# Patient Record
Sex: FEMALE | Race: WHITE | NOT HISPANIC OR LATINO
[De-identification: names, ages, dates, MRNs, and addresses within clinical notes are randomized per-mention and may not be internally consistent; named-entity substitution may affect disease eponyms.]

---

## 2017-02-15 ENCOUNTER — APPOINTMENT (OUTPATIENT)
Dept: SURGERY | Facility: CLINIC | Age: 62
End: 2017-02-15

## 2017-02-27 ENCOUNTER — OUTPATIENT (OUTPATIENT)
Dept: OUTPATIENT SERVICES | Facility: HOSPITAL | Age: 62
LOS: 1 days | Discharge: HOME | End: 2017-02-27

## 2017-02-27 DIAGNOSIS — Z98.89 OTHER SPECIFIED POSTPROCEDURAL STATES: Chronic | ICD-10-CM

## 2017-02-27 DIAGNOSIS — Z90.13 ACQUIRED ABSENCE OF BILATERAL BREASTS AND NIPPLES: Chronic | ICD-10-CM

## 2017-02-27 DIAGNOSIS — C50.919 MALIGNANT NEOPLASM OF UNSPECIFIED SITE OF UNSPECIFIED FEMALE BREAST: ICD-10-CM

## 2017-02-28 ENCOUNTER — RESULT REVIEW (OUTPATIENT)
Age: 62
End: 2017-02-28

## 2017-03-01 ENCOUNTER — APPOINTMENT (OUTPATIENT)
Dept: HEMATOLOGY ONCOLOGY | Facility: CLINIC | Age: 62
End: 2017-03-01

## 2017-03-01 VITALS
HEART RATE: 83 BPM | DIASTOLIC BLOOD PRESSURE: 70 MMHG | OXYGEN SATURATION: 98 % | SYSTOLIC BLOOD PRESSURE: 102 MMHG | TEMPERATURE: 98.2 F | WEIGHT: 144.62 LBS | BODY MASS INDEX: 24.82 KG/M2 | RESPIRATION RATE: 16 BRPM

## 2017-03-01 LAB
HCT VFR BLD CALC: 39.4 % — SIGNIFICANT CHANGE UP (ref 34.5–45)
HGB BLD-MCNC: 13.1 G/DL — SIGNIFICANT CHANGE UP (ref 11.5–15.5)
MCHC RBC-ENTMCNC: 30 PG — SIGNIFICANT CHANGE UP (ref 27–34)
MCHC RBC-ENTMCNC: 33.4 G/DL — SIGNIFICANT CHANGE UP (ref 32–36)
MCV RBC AUTO: 90 FL — SIGNIFICANT CHANGE UP (ref 80–100)
PLATELET # BLD AUTO: 118 K/UL — LOW (ref 150–400)
RBC # BLD: 4.38 M/UL — SIGNIFICANT CHANGE UP (ref 3.8–5.2)
RBC # FLD: 14 % — SIGNIFICANT CHANGE UP (ref 10.3–14.5)
WBC # BLD: 3.5 K/UL — LOW (ref 3.8–10.5)
WBC # FLD AUTO: 3.5 K/UL — LOW (ref 3.8–10.5)

## 2017-03-03 LAB
ALBUMIN SERPL ELPH-MCNC: 4.1 G/DL
ALP BLD-CCNC: 66 U/L
ALT SERPL-CCNC: 22 U/L
ANION GAP SERPL CALC-SCNC: 15 MMOL/L
AST SERPL-CCNC: 21 U/L
BILIRUB SERPL-MCNC: 1 MG/DL
BUN SERPL-MCNC: 20 MG/DL
CALCIUM SERPL-MCNC: 9.4 MG/DL
CANCER AG27-29 SERPL-ACNC: 9.2 U/ML
CEA SERPL-MCNC: 4 NG/ML
CHLORIDE SERPL-SCNC: 102 MMOL/L
CO2 SERPL-SCNC: 25 MMOL/L
CREAT SERPL-MCNC: 0.77 MG/DL
GLUCOSE SERPL-MCNC: 90 MG/DL
POTASSIUM SERPL-SCNC: 4.5 MMOL/L
PROT SERPL-MCNC: 6.1 G/DL
SODIUM SERPL-SCNC: 142 MMOL/L

## 2017-03-06 ENCOUNTER — RESULT REVIEW (OUTPATIENT)
Age: 62
End: 2017-03-06

## 2017-03-16 ENCOUNTER — APPOINTMENT (OUTPATIENT)
Dept: RADIATION ONCOLOGY | Facility: CLINIC | Age: 62
End: 2017-03-16

## 2017-03-20 ENCOUNTER — FORM ENCOUNTER (OUTPATIENT)
Age: 62
End: 2017-03-20

## 2017-03-21 ENCOUNTER — APPOINTMENT (OUTPATIENT)
Dept: NUCLEAR MEDICINE | Facility: IMAGING CENTER | Age: 62
End: 2017-03-21

## 2017-03-21 ENCOUNTER — OUTPATIENT (OUTPATIENT)
Dept: OUTPATIENT SERVICES | Facility: HOSPITAL | Age: 62
LOS: 1 days | End: 2017-03-21
Payer: COMMERCIAL

## 2017-03-21 DIAGNOSIS — Z98.89 OTHER SPECIFIED POSTPROCEDURAL STATES: Chronic | ICD-10-CM

## 2017-03-21 DIAGNOSIS — C50.919 MALIGNANT NEOPLASM OF UNSPECIFIED SITE OF UNSPECIFIED FEMALE BREAST: ICD-10-CM

## 2017-03-21 DIAGNOSIS — Z90.13 ACQUIRED ABSENCE OF BILATERAL BREASTS AND NIPPLES: Chronic | ICD-10-CM

## 2017-03-21 PROCEDURE — A9552: CPT

## 2017-03-21 PROCEDURE — 78815 PET IMAGE W/CT SKULL-THIGH: CPT

## 2017-03-23 ENCOUNTER — FORM ENCOUNTER (OUTPATIENT)
Age: 62
End: 2017-03-23

## 2017-03-24 ENCOUNTER — OUTPATIENT (OUTPATIENT)
Dept: OUTPATIENT SERVICES | Facility: HOSPITAL | Age: 62
LOS: 1 days | End: 2017-03-24
Payer: COMMERCIAL

## 2017-03-24 ENCOUNTER — APPOINTMENT (OUTPATIENT)
Dept: RADIOLOGY | Facility: IMAGING CENTER | Age: 62
End: 2017-03-24

## 2017-03-24 DIAGNOSIS — Z98.89 OTHER SPECIFIED POSTPROCEDURAL STATES: Chronic | ICD-10-CM

## 2017-03-24 DIAGNOSIS — Z90.13 ACQUIRED ABSENCE OF BILATERAL BREASTS AND NIPPLES: Chronic | ICD-10-CM

## 2017-03-24 DIAGNOSIS — Z00.8 ENCOUNTER FOR OTHER GENERAL EXAMINATION: ICD-10-CM

## 2017-03-24 PROCEDURE — 77080 DXA BONE DENSITY AXIAL: CPT

## 2017-03-27 ENCOUNTER — APPOINTMENT (OUTPATIENT)
Dept: RADIATION ONCOLOGY | Facility: CLINIC | Age: 62
End: 2017-03-27

## 2017-03-27 VITALS
SYSTOLIC BLOOD PRESSURE: 103 MMHG | DIASTOLIC BLOOD PRESSURE: 66 MMHG | RESPIRATION RATE: 16 BRPM | OXYGEN SATURATION: 98 %

## 2017-03-28 RX ORDER — ANASTROZOLE TABLETS 1 MG/1
1 TABLET ORAL DAILY
Qty: 90 | Refills: 3 | Status: COMPLETED | COMMUNITY
Start: 2017-03-28 | End: 2018-03-23

## 2017-04-10 ENCOUNTER — OUTPATIENT (OUTPATIENT)
Dept: OUTPATIENT SERVICES | Facility: HOSPITAL | Age: 62
LOS: 1 days | Discharge: HOME | End: 2017-04-10

## 2017-04-10 DIAGNOSIS — Z90.13 ACQUIRED ABSENCE OF BILATERAL BREASTS AND NIPPLES: Chronic | ICD-10-CM

## 2017-04-10 DIAGNOSIS — Z98.89 OTHER SPECIFIED POSTPROCEDURAL STATES: Chronic | ICD-10-CM

## 2017-04-10 DIAGNOSIS — C50.919 MALIGNANT NEOPLASM OF UNSPECIFIED SITE OF UNSPECIFIED FEMALE BREAST: ICD-10-CM

## 2017-04-11 ENCOUNTER — APPOINTMENT (OUTPATIENT)
Dept: HEMATOLOGY ONCOLOGY | Facility: CLINIC | Age: 62
End: 2017-04-11

## 2017-04-11 VITALS
SYSTOLIC BLOOD PRESSURE: 108 MMHG | DIASTOLIC BLOOD PRESSURE: 69 MMHG | HEART RATE: 77 BPM | WEIGHT: 147.05 LBS | BODY MASS INDEX: 25.24 KG/M2 | OXYGEN SATURATION: 98 % | TEMPERATURE: 98 F | RESPIRATION RATE: 16 BRPM

## 2017-04-11 VITALS — HEIGHT: 64.57 IN | BODY MASS INDEX: 24.8 KG/M2

## 2017-04-16 ENCOUNTER — RESULT REVIEW (OUTPATIENT)
Age: 62
End: 2017-04-16

## 2017-04-17 ENCOUNTER — APPOINTMENT (OUTPATIENT)
Dept: HEMATOLOGY ONCOLOGY | Facility: CLINIC | Age: 62
End: 2017-04-17

## 2017-04-17 LAB
HCT VFR BLD CALC: 39.9 % — SIGNIFICANT CHANGE UP (ref 34.5–45)
HGB BLD-MCNC: 13.6 G/DL — SIGNIFICANT CHANGE UP (ref 11.5–15.5)
MCHC RBC-ENTMCNC: 31.7 PG — SIGNIFICANT CHANGE UP (ref 27–34)
MCHC RBC-ENTMCNC: 34.1 G/DL — SIGNIFICANT CHANGE UP (ref 32–36)
MCV RBC AUTO: 92.8 FL — SIGNIFICANT CHANGE UP (ref 80–100)
PLATELET # BLD AUTO: 153 K/UL — SIGNIFICANT CHANGE UP (ref 150–400)
RBC # BLD: 4.3 M/UL — SIGNIFICANT CHANGE UP (ref 3.8–5.2)
RBC # FLD: 13.6 % — SIGNIFICANT CHANGE UP (ref 10.3–14.5)
WBC # BLD: 4.6 K/UL — SIGNIFICANT CHANGE UP (ref 3.8–10.5)
WBC # FLD AUTO: 4.6 K/UL — SIGNIFICANT CHANGE UP (ref 3.8–10.5)

## 2017-04-30 ENCOUNTER — RESULT REVIEW (OUTPATIENT)
Age: 62
End: 2017-04-30

## 2017-05-01 ENCOUNTER — APPOINTMENT (OUTPATIENT)
Dept: HEMATOLOGY ONCOLOGY | Facility: CLINIC | Age: 62
End: 2017-05-01

## 2017-05-01 VITALS — TEMPERATURE: 98.2 F

## 2017-05-12 ENCOUNTER — OUTPATIENT (OUTPATIENT)
Dept: OUTPATIENT SERVICES | Facility: HOSPITAL | Age: 62
LOS: 1 days | Discharge: HOME | End: 2017-05-12

## 2017-05-12 DIAGNOSIS — R00.2 PALPITATIONS: ICD-10-CM

## 2017-05-12 DIAGNOSIS — Z90.13 ACQUIRED ABSENCE OF BILATERAL BREASTS AND NIPPLES: Chronic | ICD-10-CM

## 2017-05-12 DIAGNOSIS — Z98.89 OTHER SPECIFIED POSTPROCEDURAL STATES: Chronic | ICD-10-CM

## 2017-05-12 DIAGNOSIS — C50.919 MALIGNANT NEOPLASM OF UNSPECIFIED SITE OF UNSPECIFIED FEMALE BREAST: ICD-10-CM

## 2017-05-15 ENCOUNTER — RESULT REVIEW (OUTPATIENT)
Age: 62
End: 2017-05-15

## 2017-05-15 ENCOUNTER — APPOINTMENT (OUTPATIENT)
Dept: HEMATOLOGY ONCOLOGY | Facility: CLINIC | Age: 62
End: 2017-05-15

## 2017-05-15 LAB
BASOPHILS # BLD AUTO: 0 K/UL — SIGNIFICANT CHANGE UP (ref 0–0.2)
BASOPHILS NFR BLD AUTO: 1.5 % — SIGNIFICANT CHANGE UP (ref 0–2)
EOSINOPHIL # BLD AUTO: 0.1 K/UL — SIGNIFICANT CHANGE UP (ref 0–0.5)
EOSINOPHIL NFR BLD AUTO: 2.4 % — SIGNIFICANT CHANGE UP (ref 0–6)
HCT VFR BLD CALC: 32.8 % — LOW (ref 34.5–45)
HGB BLD-MCNC: 12.2 G/DL — SIGNIFICANT CHANGE UP (ref 11.5–15.5)
LYMPHOCYTES # BLD AUTO: 1 K/UL — SIGNIFICANT CHANGE UP (ref 1–3.3)
LYMPHOCYTES # BLD AUTO: 35 % — SIGNIFICANT CHANGE UP (ref 13–44)
MCHC RBC-ENTMCNC: 35.1 PG — HIGH (ref 27–34)
MCHC RBC-ENTMCNC: 37.3 G/DL — HIGH (ref 32–36)
MCV RBC AUTO: 94 FL — SIGNIFICANT CHANGE UP (ref 80–100)
MONOCYTES # BLD AUTO: 0.3 K/UL — SIGNIFICANT CHANGE UP (ref 0–0.9)
MONOCYTES NFR BLD AUTO: 11.4 % — SIGNIFICANT CHANGE UP (ref 2–14)
NEUTROPHILS # BLD AUTO: 1.4 K/UL — LOW (ref 1.8–7.4)
NEUTROPHILS NFR BLD AUTO: 49.7 % — SIGNIFICANT CHANGE UP (ref 43–77)
PLATELET # BLD AUTO: 156 K/UL — SIGNIFICANT CHANGE UP (ref 150–400)
RBC # BLD: 3.49 M/UL — LOW (ref 3.8–5.2)
RBC # FLD: 14.4 % — SIGNIFICANT CHANGE UP (ref 10.3–14.5)
WBC # BLD: 2.8 K/UL — LOW (ref 3.8–10.5)
WBC # FLD AUTO: 2.8 K/UL — LOW (ref 3.8–10.5)

## 2017-05-16 ENCOUNTER — APPOINTMENT (OUTPATIENT)
Dept: HEMATOLOGY ONCOLOGY | Facility: CLINIC | Age: 62
End: 2017-05-16

## 2017-05-16 VITALS
DIASTOLIC BLOOD PRESSURE: 64 MMHG | OXYGEN SATURATION: 98 % | TEMPERATURE: 98.2 F | BODY MASS INDEX: 25.21 KG/M2 | HEART RATE: 64 BPM | WEIGHT: 149.47 LBS | SYSTOLIC BLOOD PRESSURE: 111 MMHG | RESPIRATION RATE: 16 BRPM

## 2017-05-16 LAB
ALBUMIN SERPL ELPH-MCNC: 3.9 G/DL
ALP BLD-CCNC: 69 U/L
ALT SERPL-CCNC: 12 U/L
ANION GAP SERPL CALC-SCNC: 12 MMOL/L
AST SERPL-CCNC: 14 U/L
BILIRUB SERPL-MCNC: 0.9 MG/DL
BUN SERPL-MCNC: 17 MG/DL
CALCIUM SERPL-MCNC: 8.9 MG/DL
CHLORIDE SERPL-SCNC: 103 MMOL/L
CO2 SERPL-SCNC: 24 MMOL/L
CREAT SERPL-MCNC: 0.91 MG/DL
GLUCOSE SERPL-MCNC: 103 MG/DL
POTASSIUM SERPL-SCNC: 4.2 MMOL/L
PROT SERPL-MCNC: 6.1 G/DL
SODIUM SERPL-SCNC: 139 MMOL/L

## 2017-05-30 ENCOUNTER — RESULT REVIEW (OUTPATIENT)
Age: 62
End: 2017-05-30

## 2017-05-30 ENCOUNTER — APPOINTMENT (OUTPATIENT)
Dept: HEMATOLOGY ONCOLOGY | Facility: CLINIC | Age: 62
End: 2017-05-30

## 2017-05-30 LAB
BASOPHILS # BLD AUTO: 0 K/UL — SIGNIFICANT CHANGE UP (ref 0–0.2)
BASOPHILS NFR BLD AUTO: 1.5 % — SIGNIFICANT CHANGE UP (ref 0–2)
EOSINOPHIL # BLD AUTO: 0.1 K/UL — SIGNIFICANT CHANGE UP (ref 0–0.5)
EOSINOPHIL NFR BLD AUTO: 5.3 % — SIGNIFICANT CHANGE UP (ref 0–6)
HCT VFR BLD CALC: 35.5 % — SIGNIFICANT CHANGE UP (ref 34.5–45)
HGB BLD-MCNC: 12.6 G/DL — SIGNIFICANT CHANGE UP (ref 11.5–15.5)
LYMPHOCYTES # BLD AUTO: 0.8 K/UL — LOW (ref 1–3.3)
LYMPHOCYTES # BLD AUTO: 29.5 % — SIGNIFICANT CHANGE UP (ref 13–44)
MCHC RBC-ENTMCNC: 34.3 PG — HIGH (ref 27–34)
MCHC RBC-ENTMCNC: 35.6 G/DL — SIGNIFICANT CHANGE UP (ref 32–36)
MCV RBC AUTO: 96.3 FL — SIGNIFICANT CHANGE UP (ref 80–100)
MONOCYTES # BLD AUTO: 0.1 K/UL — SIGNIFICANT CHANGE UP (ref 0–0.9)
MONOCYTES NFR BLD AUTO: 4.8 % — SIGNIFICANT CHANGE UP (ref 2–14)
NEUTROPHILS # BLD AUTO: 1.7 K/UL — LOW (ref 1.8–7.4)
NEUTROPHILS NFR BLD AUTO: 58.9 % — SIGNIFICANT CHANGE UP (ref 43–77)
PLATELET # BLD AUTO: 175 K/UL — SIGNIFICANT CHANGE UP (ref 150–400)
RBC # BLD: 3.68 M/UL — LOW (ref 3.8–5.2)
RBC # FLD: 13.9 % — SIGNIFICANT CHANGE UP (ref 10.3–14.5)
WBC # BLD: 2.8 K/UL — LOW (ref 3.8–10.5)
WBC # FLD AUTO: 2.8 K/UL — LOW (ref 3.8–10.5)

## 2017-06-08 ENCOUNTER — OUTPATIENT (OUTPATIENT)
Dept: OUTPATIENT SERVICES | Facility: HOSPITAL | Age: 62
LOS: 1 days | Discharge: HOME | End: 2017-06-08

## 2017-06-08 DIAGNOSIS — Z98.89 OTHER SPECIFIED POSTPROCEDURAL STATES: Chronic | ICD-10-CM

## 2017-06-08 DIAGNOSIS — Z90.13 ACQUIRED ABSENCE OF BILATERAL BREASTS AND NIPPLES: Chronic | ICD-10-CM

## 2017-06-08 DIAGNOSIS — C50.919 MALIGNANT NEOPLASM OF UNSPECIFIED SITE OF UNSPECIFIED FEMALE BREAST: ICD-10-CM

## 2017-06-12 ENCOUNTER — APPOINTMENT (OUTPATIENT)
Dept: HEMATOLOGY ONCOLOGY | Facility: CLINIC | Age: 62
End: 2017-06-12

## 2017-06-13 ENCOUNTER — RESULT REVIEW (OUTPATIENT)
Age: 62
End: 2017-06-13

## 2017-06-13 ENCOUNTER — APPOINTMENT (OUTPATIENT)
Dept: HEMATOLOGY ONCOLOGY | Facility: CLINIC | Age: 62
End: 2017-06-13

## 2017-06-14 ENCOUNTER — APPOINTMENT (OUTPATIENT)
Dept: HEMATOLOGY ONCOLOGY | Facility: CLINIC | Age: 62
End: 2017-06-14

## 2017-06-14 VITALS
HEART RATE: 67 BPM | TEMPERATURE: 98.6 F | BODY MASS INDEX: 25.13 KG/M2 | WEIGHT: 148.99 LBS | OXYGEN SATURATION: 98 % | HEIGHT: 64.57 IN | RESPIRATION RATE: 16 BRPM | DIASTOLIC BLOOD PRESSURE: 64 MMHG | SYSTOLIC BLOOD PRESSURE: 102 MMHG

## 2017-06-14 LAB
ALBUMIN SERPL ELPH-MCNC: 4.3 G/DL
ALP BLD-CCNC: 72 U/L
ALT SERPL-CCNC: 44 U/L
ANION GAP SERPL CALC-SCNC: 15 MMOL/L
AST SERPL-CCNC: 31 U/L
BILIRUB SERPL-MCNC: 1 MG/DL
BUN SERPL-MCNC: 17 MG/DL
CALCIUM SERPL-MCNC: 9.2 MG/DL
CANCER AG27-29 SERPL-ACNC: 12.6 U/ML
CEA SERPL-MCNC: 4.8 NG/ML
CHLORIDE SERPL-SCNC: 104 MMOL/L
CO2 SERPL-SCNC: 23 MMOL/L
CREAT SERPL-MCNC: 0.87 MG/DL
GLUCOSE SERPL-MCNC: 98 MG/DL
POTASSIUM SERPL-SCNC: 4.5 MMOL/L
PROT SERPL-MCNC: 6.4 G/DL
SODIUM SERPL-SCNC: 142 MMOL/L

## 2017-06-29 ENCOUNTER — MESSAGE (OUTPATIENT)
Age: 62
End: 2017-06-29

## 2017-07-11 ENCOUNTER — OUTPATIENT (OUTPATIENT)
Dept: OUTPATIENT SERVICES | Facility: HOSPITAL | Age: 62
LOS: 1 days | Discharge: HOME | End: 2017-07-11

## 2017-07-11 DIAGNOSIS — C50.919 MALIGNANT NEOPLASM OF UNSPECIFIED SITE OF UNSPECIFIED FEMALE BREAST: ICD-10-CM

## 2017-07-11 DIAGNOSIS — Z90.13 ACQUIRED ABSENCE OF BILATERAL BREASTS AND NIPPLES: Chronic | ICD-10-CM

## 2017-07-11 DIAGNOSIS — Z98.89 OTHER SPECIFIED POSTPROCEDURAL STATES: Chronic | ICD-10-CM

## 2017-07-12 ENCOUNTER — RESULT REVIEW (OUTPATIENT)
Age: 62
End: 2017-07-12

## 2017-07-12 ENCOUNTER — APPOINTMENT (OUTPATIENT)
Dept: HEMATOLOGY ONCOLOGY | Facility: CLINIC | Age: 62
End: 2017-07-12

## 2017-07-12 LAB
BASOPHILS # BLD AUTO: 0 K/UL — SIGNIFICANT CHANGE UP (ref 0–0.2)
BASOPHILS NFR BLD AUTO: 1.4 % — SIGNIFICANT CHANGE UP (ref 0–2)
EOSINOPHIL # BLD AUTO: 0.1 K/UL — SIGNIFICANT CHANGE UP (ref 0–0.5)
EOSINOPHIL NFR BLD AUTO: 4 % — SIGNIFICANT CHANGE UP (ref 0–6)
HCT VFR BLD CALC: 38 % — SIGNIFICANT CHANGE UP (ref 34.5–45)
HGB BLD-MCNC: 13.6 G/DL — SIGNIFICANT CHANGE UP (ref 11.5–15.5)
LYMPHOCYTES # BLD AUTO: 0.9 K/UL — LOW (ref 1–3.3)
LYMPHOCYTES # BLD AUTO: 35.4 % — SIGNIFICANT CHANGE UP (ref 13–44)
MCHC RBC-ENTMCNC: 35.8 G/DL — SIGNIFICANT CHANGE UP (ref 32–36)
MCHC RBC-ENTMCNC: 36.2 PG — HIGH (ref 27–34)
MCV RBC AUTO: 101 FL — HIGH (ref 80–100)
MONOCYTES # BLD AUTO: 0.3 K/UL — SIGNIFICANT CHANGE UP (ref 0–0.9)
MONOCYTES NFR BLD AUTO: 12.8 % — SIGNIFICANT CHANGE UP (ref 2–14)
NEUTROPHILS # BLD AUTO: 1.2 K/UL — LOW (ref 1.8–7.4)
NEUTROPHILS NFR BLD AUTO: 46.4 % — SIGNIFICANT CHANGE UP (ref 43–77)
PLATELET # BLD AUTO: 124 K/UL — LOW (ref 150–400)
RBC # BLD: 3.76 M/UL — LOW (ref 3.8–5.2)
RBC # FLD: 13.8 % — SIGNIFICANT CHANGE UP (ref 10.3–14.5)
WBC # BLD: 2.5 K/UL — LOW (ref 3.8–10.5)
WBC # FLD AUTO: 2.5 K/UL — LOW (ref 3.8–10.5)

## 2017-07-14 ENCOUNTER — FORM ENCOUNTER (OUTPATIENT)
Age: 62
End: 2017-07-14

## 2017-07-15 ENCOUNTER — OUTPATIENT (OUTPATIENT)
Dept: OUTPATIENT SERVICES | Facility: HOSPITAL | Age: 62
LOS: 1 days | End: 2017-07-15
Payer: COMMERCIAL

## 2017-07-15 ENCOUNTER — APPOINTMENT (OUTPATIENT)
Dept: CT IMAGING | Facility: IMAGING CENTER | Age: 62
End: 2017-07-15

## 2017-07-15 DIAGNOSIS — Z90.13 ACQUIRED ABSENCE OF BILATERAL BREASTS AND NIPPLES: Chronic | ICD-10-CM

## 2017-07-15 DIAGNOSIS — Z98.89 OTHER SPECIFIED POSTPROCEDURAL STATES: Chronic | ICD-10-CM

## 2017-07-15 DIAGNOSIS — C50.919 MALIGNANT NEOPLASM OF UNSPECIFIED SITE OF UNSPECIFIED FEMALE BREAST: ICD-10-CM

## 2017-07-15 PROCEDURE — 74177 CT ABD & PELVIS W/CONTRAST: CPT

## 2017-07-15 PROCEDURE — 71260 CT THORAX DX C+: CPT

## 2017-07-17 LAB — CEA SERPL-MCNC: 4.8 NG/ML

## 2017-08-02 ENCOUNTER — APPOINTMENT (OUTPATIENT)
Dept: HEMATOLOGY ONCOLOGY | Facility: CLINIC | Age: 62
End: 2017-08-02
Payer: COMMERCIAL

## 2017-08-02 ENCOUNTER — RESULT REVIEW (OUTPATIENT)
Age: 62
End: 2017-08-02

## 2017-08-02 LAB
BASOPHILS # BLD AUTO: 0 K/UL — SIGNIFICANT CHANGE UP (ref 0–0.2)
BASOPHILS NFR BLD AUTO: 1.8 % — SIGNIFICANT CHANGE UP (ref 0–2)
EOSINOPHIL # BLD AUTO: 0.2 K/UL — SIGNIFICANT CHANGE UP (ref 0–0.5)
EOSINOPHIL NFR BLD AUTO: 7.8 % — HIGH (ref 0–6)
HCT VFR BLD CALC: 38.7 % — SIGNIFICANT CHANGE UP (ref 34.5–45)
HGB BLD-MCNC: 14 G/DL — SIGNIFICANT CHANGE UP (ref 11.5–15.5)
LYMPHOCYTES # BLD AUTO: 0.7 K/UL — LOW (ref 1–3.3)
LYMPHOCYTES # BLD AUTO: 26.8 % — SIGNIFICANT CHANGE UP (ref 13–44)
MCHC RBC-ENTMCNC: 36.2 G/DL — HIGH (ref 32–36)
MCHC RBC-ENTMCNC: 36.8 PG — HIGH (ref 27–34)
MCV RBC AUTO: 102 FL — HIGH (ref 80–100)
MONOCYTES # BLD AUTO: 0.1 K/UL — SIGNIFICANT CHANGE UP (ref 0–0.9)
MONOCYTES NFR BLD AUTO: 5.2 % — SIGNIFICANT CHANGE UP (ref 2–14)
NEUTROPHILS # BLD AUTO: 1.5 K/UL — LOW (ref 1.8–7.4)
NEUTROPHILS NFR BLD AUTO: 58.4 % — SIGNIFICANT CHANGE UP (ref 43–77)
PLATELET # BLD AUTO: 107 K/UL — LOW (ref 150–400)
RBC # BLD: 3.8 M/UL — SIGNIFICANT CHANGE UP (ref 3.8–5.2)
RBC # FLD: 13.1 % — SIGNIFICANT CHANGE UP (ref 10.3–14.5)
WBC # BLD: 2.5 K/UL — LOW (ref 3.8–10.5)
WBC # FLD AUTO: 2.5 K/UL — LOW (ref 3.8–10.5)

## 2017-08-02 PROCEDURE — 99499A: CUSTOM | Mod: NC

## 2017-08-11 ENCOUNTER — MESSAGE (OUTPATIENT)
Age: 62
End: 2017-08-11

## 2017-08-11 LAB
CEA SERPL-MCNC: 4.7 NG/ML
INR PPP: 0.98 RATIO
PT BLD: 11.1 SEC

## 2017-08-28 ENCOUNTER — OUTPATIENT (OUTPATIENT)
Dept: OUTPATIENT SERVICES | Facility: HOSPITAL | Age: 62
LOS: 1 days | Discharge: HOME | End: 2017-08-28

## 2017-08-28 DIAGNOSIS — Z90.13 ACQUIRED ABSENCE OF BILATERAL BREASTS AND NIPPLES: Chronic | ICD-10-CM

## 2017-08-28 DIAGNOSIS — C50.919 MALIGNANT NEOPLASM OF UNSPECIFIED SITE OF UNSPECIFIED FEMALE BREAST: ICD-10-CM

## 2017-08-28 DIAGNOSIS — Z98.89 OTHER SPECIFIED POSTPROCEDURAL STATES: Chronic | ICD-10-CM

## 2017-09-05 ENCOUNTER — APPOINTMENT (OUTPATIENT)
Dept: HEMATOLOGY ONCOLOGY | Facility: CLINIC | Age: 62
End: 2017-09-05

## 2017-09-06 ENCOUNTER — APPOINTMENT (OUTPATIENT)
Dept: HEMATOLOGY ONCOLOGY | Facility: CLINIC | Age: 62
End: 2017-09-06
Payer: COMMERCIAL

## 2017-09-06 VITALS
OXYGEN SATURATION: 97 % | HEART RATE: 116 BPM | SYSTOLIC BLOOD PRESSURE: 105 MMHG | TEMPERATURE: 98.3 F | DIASTOLIC BLOOD PRESSURE: 64 MMHG | RESPIRATION RATE: 17 BRPM | WEIGHT: 151.46 LBS | BODY MASS INDEX: 25.54 KG/M2

## 2017-09-06 LAB
CANCER AG27-29 SERPL-ACNC: 12.4 U/ML
CEA SERPL-MCNC: 4.4 NG/ML

## 2017-09-06 PROCEDURE — 99214 OFFICE O/P EST MOD 30 MIN: CPT

## 2017-10-20 ENCOUNTER — FORM ENCOUNTER (OUTPATIENT)
Age: 62
End: 2017-10-20

## 2017-10-21 ENCOUNTER — OUTPATIENT (OUTPATIENT)
Dept: OUTPATIENT SERVICES | Facility: HOSPITAL | Age: 62
LOS: 1 days | End: 2017-10-21
Payer: COMMERCIAL

## 2017-10-21 ENCOUNTER — APPOINTMENT (OUTPATIENT)
Dept: CT IMAGING | Facility: IMAGING CENTER | Age: 62
End: 2017-10-21
Payer: COMMERCIAL

## 2017-10-21 DIAGNOSIS — C50.919 MALIGNANT NEOPLASM OF UNSPECIFIED SITE OF UNSPECIFIED FEMALE BREAST: ICD-10-CM

## 2017-10-21 DIAGNOSIS — Z98.89 OTHER SPECIFIED POSTPROCEDURAL STATES: Chronic | ICD-10-CM

## 2017-10-21 DIAGNOSIS — R97.0 ELEVATED CARCINOEMBRYONIC ANTIGEN [CEA]: ICD-10-CM

## 2017-10-21 DIAGNOSIS — Z90.13 ACQUIRED ABSENCE OF BILATERAL BREASTS AND NIPPLES: Chronic | ICD-10-CM

## 2017-10-21 PROCEDURE — 74177 CT ABD & PELVIS W/CONTRAST: CPT | Mod: 26

## 2017-10-21 PROCEDURE — 71260 CT THORAX DX C+: CPT | Mod: 26

## 2017-10-21 PROCEDURE — 82565 ASSAY OF CREATININE: CPT

## 2017-10-21 PROCEDURE — 74177 CT ABD & PELVIS W/CONTRAST: CPT

## 2017-10-21 PROCEDURE — 71260 CT THORAX DX C+: CPT

## 2017-11-24 ENCOUNTER — OUTPATIENT (OUTPATIENT)
Dept: OUTPATIENT SERVICES | Facility: HOSPITAL | Age: 62
LOS: 1 days | Discharge: HOME | End: 2017-11-24

## 2017-11-24 DIAGNOSIS — Z90.13 ACQUIRED ABSENCE OF BILATERAL BREASTS AND NIPPLES: Chronic | ICD-10-CM

## 2017-11-24 DIAGNOSIS — Z98.89 OTHER SPECIFIED POSTPROCEDURAL STATES: Chronic | ICD-10-CM

## 2017-11-24 DIAGNOSIS — C50.919 MALIGNANT NEOPLASM OF UNSPECIFIED SITE OF UNSPECIFIED FEMALE BREAST: ICD-10-CM

## 2017-11-29 ENCOUNTER — APPOINTMENT (OUTPATIENT)
Dept: HEMATOLOGY ONCOLOGY | Facility: CLINIC | Age: 62
End: 2017-11-29

## 2017-11-30 ENCOUNTER — APPOINTMENT (OUTPATIENT)
Dept: HEMATOLOGY ONCOLOGY | Facility: CLINIC | Age: 62
End: 2017-11-30
Payer: COMMERCIAL

## 2017-11-30 ENCOUNTER — APPOINTMENT (OUTPATIENT)
Dept: HEMATOLOGY ONCOLOGY | Facility: CLINIC | Age: 62
End: 2017-11-30

## 2017-11-30 VITALS
RESPIRATION RATE: 16 BRPM | BODY MASS INDEX: 25.47 KG/M2 | HEART RATE: 63 BPM | SYSTOLIC BLOOD PRESSURE: 112 MMHG | TEMPERATURE: 98.4 F | DIASTOLIC BLOOD PRESSURE: 67 MMHG | OXYGEN SATURATION: 98 % | WEIGHT: 151.02 LBS

## 2017-11-30 LAB
CANCER AG27-29 SERPL-ACNC: 15.3 U/ML
CEA SERPL-MCNC: 4 NG/ML

## 2017-11-30 PROCEDURE — 99214 OFFICE O/P EST MOD 30 MIN: CPT

## 2017-11-30 RX ORDER — ANASTROZOLE TABLETS 1 MG/1
1 TABLET ORAL DAILY
Qty: 90 | Refills: 3 | Status: COMPLETED | COMMUNITY
Start: 2017-11-30 | End: 2018-11-25

## 2018-01-31 ENCOUNTER — CHART COPY (OUTPATIENT)
Age: 63
End: 2018-01-31

## 2018-02-02 ENCOUNTER — APPOINTMENT (OUTPATIENT)
Dept: SURGERY | Facility: CLINIC | Age: 63
End: 2018-02-02
Payer: COMMERCIAL

## 2018-02-02 PROCEDURE — 99213K: CUSTOM

## 2018-02-14 ENCOUNTER — OUTPATIENT (OUTPATIENT)
Dept: OUTPATIENT SERVICES | Facility: HOSPITAL | Age: 63
LOS: 1 days | Discharge: HOME | End: 2018-02-14

## 2018-02-14 DIAGNOSIS — Z98.89 OTHER SPECIFIED POSTPROCEDURAL STATES: Chronic | ICD-10-CM

## 2018-02-14 DIAGNOSIS — C50.919 MALIGNANT NEOPLASM OF UNSPECIFIED SITE OF UNSPECIFIED FEMALE BREAST: ICD-10-CM

## 2018-02-14 DIAGNOSIS — Z90.13 ACQUIRED ABSENCE OF BILATERAL BREASTS AND NIPPLES: Chronic | ICD-10-CM

## 2018-02-20 ENCOUNTER — APPOINTMENT (OUTPATIENT)
Dept: HEMATOLOGY ONCOLOGY | Facility: CLINIC | Age: 63
End: 2018-02-20

## 2018-02-21 ENCOUNTER — APPOINTMENT (OUTPATIENT)
Dept: HEMATOLOGY ONCOLOGY | Facility: CLINIC | Age: 63
End: 2018-02-21
Payer: COMMERCIAL

## 2018-02-21 VITALS
DIASTOLIC BLOOD PRESSURE: 73 MMHG | RESPIRATION RATE: 16 BRPM | HEART RATE: 73 BPM | SYSTOLIC BLOOD PRESSURE: 111 MMHG | OXYGEN SATURATION: 98 % | TEMPERATURE: 98.3 F | WEIGHT: 147.25 LBS | BODY MASS INDEX: 24.83 KG/M2

## 2018-02-21 PROCEDURE — 99215 OFFICE O/P EST HI 40 MIN: CPT

## 2018-05-09 LAB
CANCER AG27-29 SERPL-ACNC: 12.6 U/ML
CEA SERPL-MCNC: 4 NG/ML
CHOLEST SERPL-MCNC: 162 MG/DL
CHOLEST/HDLC SERPL: 2.4 RATIO
HDLC SERPL-MCNC: 68 MG/DL
LDLC SERPL CALC-MCNC: 85 MG/DL
TRIGL SERPL-MCNC: 43 MG/DL

## 2018-05-10 ENCOUNTER — OUTPATIENT (OUTPATIENT)
Dept: OUTPATIENT SERVICES | Facility: HOSPITAL | Age: 63
LOS: 1 days | Discharge: HOME | End: 2018-05-10

## 2018-05-10 DIAGNOSIS — Z98.89 OTHER SPECIFIED POSTPROCEDURAL STATES: Chronic | ICD-10-CM

## 2018-05-10 DIAGNOSIS — C50.919 MALIGNANT NEOPLASM OF UNSPECIFIED SITE OF UNSPECIFIED FEMALE BREAST: ICD-10-CM

## 2018-05-10 DIAGNOSIS — Z90.13 ACQUIRED ABSENCE OF BILATERAL BREASTS AND NIPPLES: Chronic | ICD-10-CM

## 2018-05-16 ENCOUNTER — APPOINTMENT (OUTPATIENT)
Dept: HEMATOLOGY ONCOLOGY | Facility: CLINIC | Age: 63
End: 2018-05-16

## 2018-05-16 ENCOUNTER — APPOINTMENT (OUTPATIENT)
Dept: HEMATOLOGY ONCOLOGY | Facility: CLINIC | Age: 63
End: 2018-05-16
Payer: COMMERCIAL

## 2018-05-16 ENCOUNTER — RESULT REVIEW (OUTPATIENT)
Age: 63
End: 2018-05-16

## 2018-05-16 LAB
BASOPHILS # BLD AUTO: 0 K/UL — SIGNIFICANT CHANGE UP (ref 0–0.2)
BASOPHILS NFR BLD AUTO: 1.4 % — SIGNIFICANT CHANGE UP (ref 0–2)
EOSINOPHIL # BLD AUTO: 0.1 K/UL — SIGNIFICANT CHANGE UP (ref 0–0.5)
EOSINOPHIL NFR BLD AUTO: 4.1 % — SIGNIFICANT CHANGE UP (ref 0–6)
HCT VFR BLD CALC: 40.1 % — SIGNIFICANT CHANGE UP (ref 34.5–45)
HGB BLD-MCNC: 14.3 G/DL — SIGNIFICANT CHANGE UP (ref 11.5–15.5)
LYMPHOCYTES # BLD AUTO: 0.9 K/UL — LOW (ref 1–3.3)
LYMPHOCYTES # BLD AUTO: 32.5 % — SIGNIFICANT CHANGE UP (ref 13–44)
MCHC RBC-ENTMCNC: 35.6 G/DL — SIGNIFICANT CHANGE UP (ref 32–36)
MCHC RBC-ENTMCNC: 36.1 PG — HIGH (ref 27–34)
MCV RBC AUTO: 101 FL — HIGH (ref 80–100)
MONOCYTES # BLD AUTO: 0.4 K/UL — SIGNIFICANT CHANGE UP (ref 0–0.9)
MONOCYTES NFR BLD AUTO: 12.7 % — SIGNIFICANT CHANGE UP (ref 2–14)
NEUTROPHILS # BLD AUTO: 1.4 K/UL — LOW (ref 1.8–7.4)
NEUTROPHILS NFR BLD AUTO: 49.3 % — SIGNIFICANT CHANGE UP (ref 43–77)
PLATELET # BLD AUTO: 127 K/UL — LOW (ref 150–400)
RBC # BLD: 3.96 M/UL — SIGNIFICANT CHANGE UP (ref 3.8–5.2)
RBC # FLD: 12.1 % — SIGNIFICANT CHANGE UP (ref 10.3–14.5)
WBC # BLD: 2.8 K/UL — LOW (ref 3.8–10.5)
WBC # FLD AUTO: 2.8 K/UL — LOW (ref 3.8–10.5)

## 2018-05-17 ENCOUNTER — APPOINTMENT (OUTPATIENT)
Dept: HEMATOLOGY ONCOLOGY | Facility: CLINIC | Age: 63
End: 2018-05-17
Payer: COMMERCIAL

## 2018-05-17 VITALS
HEART RATE: 61 BPM | WEIGHT: 145 LBS | TEMPERATURE: 98.3 F | DIASTOLIC BLOOD PRESSURE: 64 MMHG | OXYGEN SATURATION: 98 % | BODY MASS INDEX: 24.45 KG/M2 | RESPIRATION RATE: 16 BRPM | SYSTOLIC BLOOD PRESSURE: 102 MMHG

## 2018-05-17 DIAGNOSIS — D64.9 ANEMIA, UNSPECIFIED: ICD-10-CM

## 2018-05-17 PROCEDURE — 99214 OFFICE O/P EST MOD 30 MIN: CPT

## 2018-08-03 ENCOUNTER — OUTPATIENT (OUTPATIENT)
Dept: OUTPATIENT SERVICES | Facility: HOSPITAL | Age: 63
LOS: 1 days | Discharge: HOME | End: 2018-08-03

## 2018-08-03 DIAGNOSIS — Z98.89 OTHER SPECIFIED POSTPROCEDURAL STATES: Chronic | ICD-10-CM

## 2018-08-03 DIAGNOSIS — C50.919 MALIGNANT NEOPLASM OF UNSPECIFIED SITE OF UNSPECIFIED FEMALE BREAST: ICD-10-CM

## 2018-08-03 DIAGNOSIS — Z90.13 ACQUIRED ABSENCE OF BILATERAL BREASTS AND NIPPLES: Chronic | ICD-10-CM

## 2018-08-14 ENCOUNTER — APPOINTMENT (OUTPATIENT)
Dept: HEMATOLOGY ONCOLOGY | Facility: CLINIC | Age: 63
End: 2018-08-14

## 2018-08-15 ENCOUNTER — APPOINTMENT (OUTPATIENT)
Dept: HEMATOLOGY ONCOLOGY | Facility: CLINIC | Age: 63
End: 2018-08-15
Payer: COMMERCIAL

## 2018-08-15 ENCOUNTER — APPOINTMENT (OUTPATIENT)
Dept: HEMATOLOGY ONCOLOGY | Facility: CLINIC | Age: 63
End: 2018-08-15

## 2018-08-15 ENCOUNTER — RESULT REVIEW (OUTPATIENT)
Age: 63
End: 2018-08-15

## 2018-08-15 VITALS
WEIGHT: 147.71 LBS | OXYGEN SATURATION: 99 % | BODY MASS INDEX: 24.91 KG/M2 | DIASTOLIC BLOOD PRESSURE: 66 MMHG | RESPIRATION RATE: 16 BRPM | HEART RATE: 58 BPM | SYSTOLIC BLOOD PRESSURE: 113 MMHG | TEMPERATURE: 97.9 F

## 2018-08-15 DIAGNOSIS — Z00.6 ENCOUNTER FOR EXAMINATION FOR NORMAL COMPARISON AND CONTROL IN CLINICAL RESEARCH PROGRAM: ICD-10-CM

## 2018-08-15 LAB
HCT VFR BLD CALC: 39.5 % — SIGNIFICANT CHANGE UP (ref 34.5–45)
HGB BLD-MCNC: 14 G/DL — SIGNIFICANT CHANGE UP (ref 11.5–15.5)
MCHC RBC-ENTMCNC: 35.4 G/DL — SIGNIFICANT CHANGE UP (ref 32–36)
MCHC RBC-ENTMCNC: 35.9 PG — HIGH (ref 27–34)
MCV RBC AUTO: 101 FL — HIGH (ref 80–100)
PLATELET # BLD AUTO: 157 K/UL — SIGNIFICANT CHANGE UP (ref 150–400)
RBC # BLD: 3.91 M/UL — SIGNIFICANT CHANGE UP (ref 3.8–5.2)
RBC # FLD: 11.7 % — SIGNIFICANT CHANGE UP (ref 10.3–14.5)
WBC # BLD: 3.9 K/UL — SIGNIFICANT CHANGE UP (ref 3.8–10.5)
WBC # FLD AUTO: 3.9 K/UL — SIGNIFICANT CHANGE UP (ref 3.8–10.5)

## 2018-08-15 PROCEDURE — 99214 OFFICE O/P EST MOD 30 MIN: CPT

## 2018-08-15 RX ORDER — CALCIUM CITRATE/VITAMIN D3 315MG-6.25
TABLET ORAL
Refills: 0 | Status: ACTIVE | COMMUNITY

## 2018-08-16 ENCOUNTER — MESSAGE (OUTPATIENT)
Age: 63
End: 2018-08-16

## 2018-08-16 LAB
CANCER AG27-29 SERPL-ACNC: 13.8 U/ML
CEA SERPL-MCNC: 4.4 NG/ML

## 2018-08-27 ENCOUNTER — RESULT REVIEW (OUTPATIENT)
Age: 63
End: 2018-08-27

## 2018-10-29 ENCOUNTER — OUTPATIENT (OUTPATIENT)
Dept: OUTPATIENT SERVICES | Facility: HOSPITAL | Age: 63
LOS: 1 days | Discharge: HOME | End: 2018-10-29

## 2018-10-29 DIAGNOSIS — Z98.89 OTHER SPECIFIED POSTPROCEDURAL STATES: Chronic | ICD-10-CM

## 2018-10-29 DIAGNOSIS — Z90.13 ACQUIRED ABSENCE OF BILATERAL BREASTS AND NIPPLES: Chronic | ICD-10-CM

## 2018-10-29 DIAGNOSIS — C50.919 MALIGNANT NEOPLASM OF UNSPECIFIED SITE OF UNSPECIFIED FEMALE BREAST: ICD-10-CM

## 2018-11-06 ENCOUNTER — APPOINTMENT (OUTPATIENT)
Dept: HEMATOLOGY ONCOLOGY | Facility: CLINIC | Age: 63
End: 2018-11-06

## 2018-11-07 ENCOUNTER — APPOINTMENT (OUTPATIENT)
Dept: HEMATOLOGY ONCOLOGY | Facility: CLINIC | Age: 63
End: 2018-11-07
Payer: COMMERCIAL

## 2018-11-07 VITALS
SYSTOLIC BLOOD PRESSURE: 119 MMHG | TEMPERATURE: 98.1 F | RESPIRATION RATE: 16 BRPM | DIASTOLIC BLOOD PRESSURE: 71 MMHG | HEART RATE: 62 BPM | WEIGHT: 151.46 LBS | BODY MASS INDEX: 25.54 KG/M2 | OXYGEN SATURATION: 99 %

## 2018-11-07 LAB
CANCER AG27-29 SERPL-ACNC: 16.3 U/ML
CEA SERPL-MCNC: 3.5 NG/ML

## 2018-11-07 PROCEDURE — 99214 OFFICE O/P EST MOD 30 MIN: CPT

## 2018-12-10 LAB
APTT BLD: 29.8 SEC
CANCER AG27-29 SERPL-ACNC: 12.9 U/ML
CEA SERPL-MCNC: 3.9 NG/ML

## 2019-01-22 ENCOUNTER — OUTPATIENT (OUTPATIENT)
Dept: OUTPATIENT SERVICES | Facility: HOSPITAL | Age: 64
LOS: 1 days | Discharge: HOME | End: 2019-01-22

## 2019-01-22 DIAGNOSIS — Z90.13 ACQUIRED ABSENCE OF BILATERAL BREASTS AND NIPPLES: Chronic | ICD-10-CM

## 2019-01-22 DIAGNOSIS — Z98.89 OTHER SPECIFIED POSTPROCEDURAL STATES: Chronic | ICD-10-CM

## 2019-01-22 DIAGNOSIS — C50.919 MALIGNANT NEOPLASM OF UNSPECIFIED SITE OF UNSPECIFIED FEMALE BREAST: ICD-10-CM

## 2019-01-29 ENCOUNTER — APPOINTMENT (OUTPATIENT)
Dept: HEMATOLOGY ONCOLOGY | Facility: CLINIC | Age: 64
End: 2019-01-29

## 2019-01-30 ENCOUNTER — APPOINTMENT (OUTPATIENT)
Dept: HEMATOLOGY ONCOLOGY | Facility: CLINIC | Age: 64
End: 2019-01-30
Payer: COMMERCIAL

## 2019-01-30 VITALS
RESPIRATION RATE: 16 BRPM | DIASTOLIC BLOOD PRESSURE: 71 MMHG | TEMPERATURE: 98.2 F | HEART RATE: 67 BPM | SYSTOLIC BLOOD PRESSURE: 115 MMHG | OXYGEN SATURATION: 98 % | BODY MASS INDEX: 25.66 KG/M2 | WEIGHT: 152.12 LBS

## 2019-01-30 PROCEDURE — 99214 OFFICE O/P EST MOD 30 MIN: CPT

## 2019-01-30 NOTE — REASON FOR VISIT
[FreeTextEntry2] : Locally advanced ER+ Her2/avni- breast cancer s/p resection; 11+ nodes; S/P dose dense AC-T. \par PALLAS ARM A.

## 2019-01-30 NOTE — PHYSICAL EXAM
[Fully active, able to carry on all pre-disease performance without restriction] : Status 0 - Fully active, able to carry on all pre-disease performance without restriction [Normal] : affect appropriate [de-identified] : bilateral reconstructed; no masses [de-identified] : )

## 2019-01-30 NOTE — HISTORY OF PRESENT ILLNESS
[de-identified] : 61 year old female with negative mammogram (5/12/2016) except for dense breasts, ultrasound (6/22/2016) right 1 cm mass and 0.5 cm mass; left 1 cm mass. Ultrasound biopsy right (6/28/2016): moderately differentiated duct cell carcinoma with micropapillary features; left benign. Breast MRI (7/14/2016): two masses; left negative. Right axillary ultrasound biopsy (8/01/2016): positive for carcinoma in node. Staging PET/CAT (8/19/2016): post surgical changes on the right chest, left renal cyst, no metastatic disease. EKG unremarkable. Had bilateral mastectomies 8/09/2016 with right: extensive invasive duct cell carcinoma with micropapillary features, lymphatic invasion, two distinct masses -- 5.5 cm and 1.0 cm with additional foci of tumor, DCIS, 11+ nodes, ER+ [place]+ Her2/avni negative. Had immediate reconstruction. Noted to be anemic. Consistent with post operative. 8/2.57/2016 hepatitis screen negative. 9/01/2016 MUGA 61%, 10/16/2016 Finished cycle #3 dose dense AC. 11/03/2016 finished paclitaxel cycle 1/4. Tolerated drug well. has finished 4 cycles of paclitaxel. Received radiation to the chest wall and signed consent for the PALLAS trial. PET/CT 3/24/2017: no metastatic disease. Bone density  3/24/2017: spine T = -2.0. Had squamous cell skin cancer resected from right clavicle 3/10/2017. Started on anastrozole, vitamin D and calcium 3/27/2017. 4/17/2017 started on PALLAS study Arm A. Has persistently elevated CEA with no obvious source. CAT scan staging 7/15/2017 ? radiation changes in lung, 3 month follow up suggested. 0/05/2017 CEA 4.4. 10/21/2017: CAT scan staging: benign. 2/20/2018 CEA remains 4.0.8/15/2018 CEA 4.  [de-identified] : infiltrating poorly differentiated micropapillary carcinoma, multifocal [de-identified] : ER+ [place]+ Her2/avni negative by [facility] [de-identified] : 7/11/2016 [facility] Cancer Next panel 32 genes negative. [FreeTextEntry1] : dose dense AC-T start 9/09/2016 -- finished. Started anastrozole 3/27/2017; on PALLAS ARM A (palbociclib) start 4/17/2017. [de-identified] : Doing well.  [de-identified] : leukopenia

## 2019-01-30 NOTE — ASSESSMENT
[Curative] : Goals of care discussed with patient: Curative [Palliative Care Plan] : not applicable at this time [FreeTextEntry1] : Patient with locally advanced ER+ breast cancer s/p mastectomy and reconstruction who has completed  dose dense AC-paclitaxel and then radiation therapy. Started anastrozole without problems and is on the PALLAS trial. No toxicity from treatment to date.. JOHANNY/stable exam. Doing well.  \par Plan:\par 1) continue palbociclib 125 mg d1-21 q 28; anastrozole 1 mg/daily\par 2) protocol blood studies\par 3) if stable reevaluate in 3 months

## 2019-02-04 ENCOUNTER — APPOINTMENT (OUTPATIENT)
Dept: SURGERY | Facility: CLINIC | Age: 64
End: 2019-02-04
Payer: COMMERCIAL

## 2019-02-04 PROCEDURE — 99213K: CUSTOM

## 2019-03-11 ENCOUNTER — TRANSCRIPTION ENCOUNTER (OUTPATIENT)
Age: 64
End: 2019-03-11

## 2019-04-10 LAB
ALBUMIN SERPL ELPH-MCNC: 4.5 G/DL
ALP BLD-CCNC: 65 U/L
ALT SERPL-CCNC: 17 U/L
ANION GAP SERPL CALC-SCNC: 16 MMOL/L
AST SERPL-CCNC: 21 U/L
BILIRUB SERPL-MCNC: 0.9 MG/DL
BUN SERPL-MCNC: 19 MG/DL
CALCIUM SERPL-MCNC: 9.5 MG/DL
CANCER AG27-29 SERPL-ACNC: 13 U/ML
CEA SERPL-MCNC: 3.8 NG/ML
CHLORIDE SERPL-SCNC: 104 MMOL/L
CO2 SERPL-SCNC: 22 MMOL/L
CREAT SERPL-MCNC: 0.8 MG/DL
GLUCOSE SERPL-MCNC: 149 MG/DL
POTASSIUM SERPL-SCNC: 4.4 MMOL/L
PROT SERPL-MCNC: 6.5 G/DL
SODIUM SERPL-SCNC: 142 MMOL/L

## 2019-05-22 ENCOUNTER — OUTPATIENT (OUTPATIENT)
Dept: OUTPATIENT SERVICES | Facility: HOSPITAL | Age: 64
LOS: 1 days | Discharge: HOME | End: 2019-05-22

## 2019-05-22 DIAGNOSIS — Z90.13 ACQUIRED ABSENCE OF BILATERAL BREASTS AND NIPPLES: Chronic | ICD-10-CM

## 2019-05-22 DIAGNOSIS — Z98.89 OTHER SPECIFIED POSTPROCEDURAL STATES: Chronic | ICD-10-CM

## 2019-05-22 DIAGNOSIS — C50.919 MALIGNANT NEOPLASM OF UNSPECIFIED SITE OF UNSPECIFIED FEMALE BREAST: ICD-10-CM

## 2019-05-29 ENCOUNTER — APPOINTMENT (OUTPATIENT)
Dept: HEMATOLOGY ONCOLOGY | Facility: CLINIC | Age: 64
End: 2019-05-29
Payer: COMMERCIAL

## 2019-05-29 VITALS
OXYGEN SATURATION: 97 % | RESPIRATION RATE: 16 BRPM | BODY MASS INDEX: 26.17 KG/M2 | TEMPERATURE: 98 F | SYSTOLIC BLOOD PRESSURE: 126 MMHG | WEIGHT: 155.2 LBS | DIASTOLIC BLOOD PRESSURE: 76 MMHG | HEART RATE: 60 BPM

## 2019-05-29 PROCEDURE — 99213 OFFICE O/P EST LOW 20 MIN: CPT

## 2019-05-29 NOTE — REASON FOR VISIT
[Spouse] : spouse [FreeTextEntry2] : Locally advanced ER+ Her2/avni- breast cancer s/p resection; 11+ nodes; S/P dose dense AC-T. \par PALLAS ARM A.

## 2019-05-29 NOTE — PHYSICAL EXAM
[Fully active, able to carry on all pre-disease performance without restriction] : Status 0 - Fully active, able to carry on all pre-disease performance without restriction [Normal] : affect appropriate [de-identified] : bilateral reconstructed; no masses [de-identified] : )

## 2019-05-29 NOTE — ASSESSMENT
[Curative] : Goals of care discussed with patient: Curative [Palliative Care Plan] : not applicable at this time [FreeTextEntry1] : Patient with locally advanced ER+ breast cancer s/p mastectomy and reconstruction who has completed  dose dense AC-paclitaxel and then radiation therapy. Started anastrozole without problems and is on the PALLAS trial. Had toxicity neutropenia on Palbo. JOHANNY/stable exam. Doing well.  \par Plan:\par 1) continue palbociclib 125 mg d1-21 q 28; anastrozole 1 mg/daily\par 2) protocol blood studies\par 3) if stable reevaluate in 3 months

## 2019-05-29 NOTE — HISTORY OF PRESENT ILLNESS
[Research Protocol] : Research Protocol  [1 - Distress Level] : Distress Level: 1 [Date: ____________] : Patient's last distress assessment performed on [unfilled]. [de-identified] : 61 year old female with negative mammogram (5/12/2016) except for dense breasts, ultrasound (6/22/2016) right 1 cm mass and 0.5 cm mass; left 1 cm mass. Ultrasound biopsy right (6/28/2016): moderately differentiated duct cell carcinoma with micropapillary features; left benign. Breast MRI (7/14/2016): two masses; left negative. Right axillary ultrasound biopsy (8/01/2016): positive for carcinoma in node. Staging PET/CAT (8/19/2016): post surgical changes on the right chest, left renal cyst, no metastatic disease. EKG unremarkable. Had bilateral mastectomies 8/09/2016 with right: extensive invasive duct cell carcinoma with micropapillary features, lymphatic invasion, two distinct masses -- 5.5 cm and 1.0 cm with additional foci of tumor, DCIS, 11+ nodes, ER+ [place]+ Her2/avni negative. Had immediate reconstruction. Noted to be anemic. Consistent with post operative. 8/2.57/2016 hepatitis screen negative. 9/01/2016 MUGA 61%, 10/16/2016 Finished cycle #3 dose dense AC. 11/03/2016 finished paclitaxel cycle 1/4. Tolerated drug well. has finished 4 cycles of paclitaxel. Received radiation to the chest wall and signed consent for the PALLAS trial. PET/CT 3/24/2017: no metastatic disease. Bone density  3/24/2017: spine T = -2.0. Had squamous cell skin cancer resected from right clavicle 3/10/2017. Started on anastrozole, vitamin D and calcium 3/27/2017. 4/17/2017 started on PALLAS study Arm A. Has persistently elevated CEA with no obvious source. CAT scan staging 7/15/2017 ? radiation changes in lung, 3 month follow up suggested. 0/05/2017 CEA 4.4. 10/21/2017: CAT scan staging: benign. 2/20/2018 CEA remains 4.0.8/15/2018 CEA 4. 4/10/2019 CEA 3.8. CA 27-29 13.  [de-identified] : ER+ [place]+ Her2/avni negative by [facility] [de-identified] : infiltrating poorly differentiated micropapillary carcinoma, multifocal [FreeTextEntry1] : dose dense AC-T start 9/09/2016 -- finished. Started anastrozole 3/27/2017; on PALLAS ARM A (palbociclib) start 4/17/2017. [de-identified] : 7/11/2016 [facility] Cancer Next panel 32 genes negative. [de-identified] : Doing well.

## 2019-05-30 LAB
CANCER AG27-29 SERPL-ACNC: 11.4 U/ML
CEA SERPL-MCNC: 3.7 NG/ML

## 2019-08-01 NOTE — REASON FOR VISIT
[Follow-Up Visit] : a follow-up [FreeTextEntry2] : Locally advanced ER+ Her2/avni- breast cancer s/p resection; 11+ nodes; S/P dose dense AC-T. \par PALLAS ARM A.

## 2019-08-01 NOTE — HISTORY OF PRESENT ILLNESS
[Disease: _____________________] : Disease: [unfilled] [T: ___] : T[unfilled] [N: ___] : N[unfilled] [M: ___] : M[unfilled] [Research Protocol] : Research Protocol  [de-identified] : infiltrating poorly differentiated micropapillary carcinoma, multifocal [de-identified] : 61 year old female with negative mammogram (5/12/2016) except for dense breasts, ultrasound (6/22/2016) right 1 cm mass and 0.5 cm mass; left 1 cm mass. Ultrasound biopsy right (6/28/2016): moderately differentiated duct cell carcinoma with micropapillary features; left benign. Breast MRI (7/14/2016): two masses; left negative. Right axillary ultrasound biopsy (8/01/2016): positive for carcinoma in node. Staging PET/CAT (8/19/2016): post surgical changes on the right chest, left renal cyst, no metastatic disease. EKG unremarkable. Had bilateral mastectomies 8/09/2016 with right: extensive invasive duct cell carcinoma with micropapillary features, lymphatic invasion, two distinct masses -- 5.5 cm and 1.0 cm with additional foci of tumor, DCIS, 11+ nodes, ER+ [place]+ Her2/avni negative. Had immediate reconstruction. Noted to be anemic. Consistent with post operative. 8/2.57/2016 hepatitis screen negative. 9/01/2016 MUGA 61%, 10/16/2016 Finished cycle #3 dose dense AC. 11/03/2016 finished paclitaxel cycle 1/4. Tolerated drug well. has finished 4 cycles of paclitaxel. Received radiation to the chest wall and signed consent for the PALLAS trial. PET/CT 3/24/2017: no metastatic disease. Bone density  3/24/2017: spine T = -2.0. Had squamous cell skin cancer resected from right clavicle 3/10/2017. Started on anastrozole, vitamin D and calcium 3/27/2017. 4/17/2017 started on PALLAS study Arm A. Has persistently elevated CEA with no obvious source. CAT scan staging 7/15/2017 ? radiation changes in lung, 3 month follow up suggested. 0/05/2017 CEA 4.4. 10/21/2017: CAT scan staging: benign. 2/20/2018 CEA remains 4.0.8/15/2018 CEA 4. 4/10/2019 CEA 3.8. CA 27-29 13.  [de-identified] : 7/11/2016 [facility] Cancer Next panel 32 genes negative. [de-identified] : ER+ [place]+ Her2/avni negative by [facility] [FreeTextEntry1] : dose dense AC-T start 9/09/2016 -- finished. Started anastrozole 3/27/2017; on PALLAS ARM A (palbociclib) start 4/17/2017.

## 2019-08-07 ENCOUNTER — OUTPATIENT (OUTPATIENT)
Dept: OUTPATIENT SERVICES | Facility: HOSPITAL | Age: 64
LOS: 1 days | Discharge: HOME | End: 2019-08-07

## 2019-08-07 DIAGNOSIS — C50.919 MALIGNANT NEOPLASM OF UNSPECIFIED SITE OF UNSPECIFIED FEMALE BREAST: ICD-10-CM

## 2019-08-07 DIAGNOSIS — Z98.89 OTHER SPECIFIED POSTPROCEDURAL STATES: Chronic | ICD-10-CM

## 2019-08-07 DIAGNOSIS — Z90.13 ACQUIRED ABSENCE OF BILATERAL BREASTS AND NIPPLES: Chronic | ICD-10-CM

## 2019-08-09 ENCOUNTER — APPOINTMENT (OUTPATIENT)
Dept: HEMATOLOGY ONCOLOGY | Facility: CLINIC | Age: 64
End: 2019-08-09

## 2019-08-20 ENCOUNTER — FORM ENCOUNTER (OUTPATIENT)
Age: 64
End: 2019-08-20

## 2019-08-21 ENCOUNTER — OUTPATIENT (OUTPATIENT)
Dept: OUTPATIENT SERVICES | Facility: HOSPITAL | Age: 64
LOS: 1 days | End: 2019-08-21
Payer: COMMERCIAL

## 2019-08-21 ENCOUNTER — APPOINTMENT (OUTPATIENT)
Dept: RADIOLOGY | Facility: IMAGING CENTER | Age: 64
End: 2019-08-21
Payer: COMMERCIAL

## 2019-08-21 DIAGNOSIS — Z98.89 OTHER SPECIFIED POSTPROCEDURAL STATES: Chronic | ICD-10-CM

## 2019-08-21 DIAGNOSIS — Z00.8 ENCOUNTER FOR OTHER GENERAL EXAMINATION: ICD-10-CM

## 2019-08-21 DIAGNOSIS — Z90.13 ACQUIRED ABSENCE OF BILATERAL BREASTS AND NIPPLES: Chronic | ICD-10-CM

## 2019-08-21 PROCEDURE — 77080 DXA BONE DENSITY AXIAL: CPT

## 2019-08-21 PROCEDURE — 77080 DXA BONE DENSITY AXIAL: CPT | Mod: 26

## 2019-09-23 ENCOUNTER — RESULT REVIEW (OUTPATIENT)
Age: 64
End: 2019-09-23

## 2019-10-21 ENCOUNTER — OUTPATIENT (OUTPATIENT)
Dept: OUTPATIENT SERVICES | Facility: HOSPITAL | Age: 64
LOS: 1 days | Discharge: HOME | End: 2019-10-21

## 2019-10-21 DIAGNOSIS — Z90.13 ACQUIRED ABSENCE OF BILATERAL BREASTS AND NIPPLES: Chronic | ICD-10-CM

## 2019-10-21 DIAGNOSIS — C50.919 MALIGNANT NEOPLASM OF UNSPECIFIED SITE OF UNSPECIFIED FEMALE BREAST: ICD-10-CM

## 2019-10-21 DIAGNOSIS — Z98.89 OTHER SPECIFIED POSTPROCEDURAL STATES: Chronic | ICD-10-CM

## 2019-10-30 ENCOUNTER — APPOINTMENT (OUTPATIENT)
Dept: HEMATOLOGY ONCOLOGY | Facility: CLINIC | Age: 64
End: 2019-10-30

## 2019-10-30 ENCOUNTER — APPOINTMENT (OUTPATIENT)
Dept: HEMATOLOGY ONCOLOGY | Facility: CLINIC | Age: 64
End: 2019-10-30
Payer: COMMERCIAL

## 2019-10-30 VITALS
BODY MASS INDEX: 25.36 KG/M2 | RESPIRATION RATE: 17 BRPM | HEART RATE: 65 BPM | OXYGEN SATURATION: 97 % | HEIGHT: 64.5 IN | TEMPERATURE: 98.1 F | DIASTOLIC BLOOD PRESSURE: 70 MMHG | SYSTOLIC BLOOD PRESSURE: 110 MMHG | WEIGHT: 150.35 LBS

## 2019-10-30 PROCEDURE — 99215 OFFICE O/P EST HI 40 MIN: CPT

## 2019-10-30 NOTE — HISTORY OF PRESENT ILLNESS
[Disease: _____________________] : Disease: [unfilled] [T: ___] : T[unfilled] [N: ___] : N[unfilled] [M: ___] : M[unfilled] [Research Protocol] : Research Protocol  [de-identified] : Patient was seen for an initial visit with me on 10/30/19 and in order to transfer her care to me from that of Dr. Hoang. the history below is as per the patient and  the AEHR notes of Dr. Hoang who left the [facility].\par \par At 61 year old female had negative mammogram (5/12/2016) except for dense breasts, ultrasound (6/22/2016) right 1 cm mass and 0.5 cm mass; left 1 cm mass. Ultrasound biopsy right (6/28/2016): moderately differentiated duct cell carcinoma with micropapillary features; left benign. Breast MRI (7/14/2016): two masses; left negative. Right axillary ultrasound biopsy (8/01/2016): positive for carcinoma in node. Staging PET/CAT (8/19/2016): post surgical changes on the right chest, left renal cyst, no metastatic disease. EKG unremarkable. Had bilateral mastectomies 8/09/2016 with right: extensive invasive duct cell carcinoma with micropapillary features, lymphatic invasion, two distinct masses -- 5.5 cm and 1.0 cm with additional foci of tumor, DCIS, 11+ nodes, ER+ [place]+ Her2/avni negative. Had immediate reconstruction. Noted to be anemic. Consistent with post operative. 8/2.57/2016 hepatitis screen negative. 9/01/2016 MUGA 61%, 10/16/2016 Finished cycle #3 dose dense AC. 11/03/2016 finished paclitaxel cycle 1/4. Tolerated drug well. has finished 4 cycles of paclitaxel. Received radiation to the chest wall and signed consent for the PALLAS trial. PET/CT 3/24/2017: no metastatic disease. Bone density  3/24/2017: spine T = -2.0. Had squamous cell skin cancer resected from right clavicle 3/10/2017. Started on anastrozole, vitamin D and calcium 3/27/2017. 4/17/2017 started on PALLAS study Arm A. Has persistently elevated CEA with no obvious source. CAT scan staging 7/15/2017 ? radiation changes in lung, 3 month follow up suggested. 0/05/2017 CEA 4.4. 10/21/2017: CAT scan staging: benign. 2/20/2018 CEA remains 4.0.8/15/2018 CEA 4. 4/10/2019 CEA 3.8. CA 27-29 13.  [de-identified] : infiltrating poorly differentiated micropapillary carcinoma, multifocal [de-identified] : ER+ PA+ Her2/avni negative by [facility] [de-identified] : 7/11/2016 [facility] Cancer Next panel 32 genes negative. [FreeTextEntry1] : dose dense AC-T start 9/09/2016 -- finished. Started anastrozole 3/27/2017; on PALLAS ARM A (palbociclib) start 4/17/2017. [de-identified] : Here for f/u and to transfer her care to me.\par \par She notes a good appetite stable weight and excellent performance status. She denies any treatment related side effects. \par  \par DEXA 8/19: osteopenia and w/o change

## 2019-10-30 NOTE — PHYSICAL EXAM
[Fully active, able to carry on all pre-disease performance without restriction] : Status 0 - Fully active, able to carry on all pre-disease performance without restriction [Normal] : affect appropriate [de-identified] : s/p B/L MRM with EMILIA flap reconstruction. No palp masses. B/L ax neg

## 2019-11-09 ENCOUNTER — TRANSCRIPTION ENCOUNTER (OUTPATIENT)
Age: 64
End: 2019-11-09

## 2019-11-13 ENCOUNTER — APPOINTMENT (OUTPATIENT)
Dept: INTERNAL MEDICINE | Facility: CLINIC | Age: 64
End: 2019-11-13
Payer: COMMERCIAL

## 2019-11-13 VITALS
TEMPERATURE: 97.8 F | WEIGHT: 149 LBS | HEART RATE: 62 BPM | OXYGEN SATURATION: 96 % | DIASTOLIC BLOOD PRESSURE: 70 MMHG | SYSTOLIC BLOOD PRESSURE: 100 MMHG | HEIGHT: 64 IN | RESPIRATION RATE: 14 BRPM | BODY MASS INDEX: 25.44 KG/M2

## 2019-11-13 DIAGNOSIS — Z23 ENCOUNTER FOR IMMUNIZATION: ICD-10-CM

## 2019-11-13 PROCEDURE — 99202 OFFICE O/P NEW SF 15 MIN: CPT | Mod: 25

## 2019-11-13 PROCEDURE — 90471 IMMUNIZATION ADMIN: CPT

## 2019-11-13 PROCEDURE — 90750 HZV VACC RECOMBINANT IM: CPT

## 2019-11-13 NOTE — HISTORY OF PRESENT ILLNESS
[FreeTextEntry1] : establish care [de-identified] : Pt is here to establish care.\par h/o stage 3 breast ca 3 yrs ago. Had bilat mastectomies. 8/2016.\par Had recent colonoscopy.\mallory Works at [employer].

## 2019-11-13 NOTE — HISTORY OF PRESENT ILLNESS
[de-identified] : Pt is here to establish care.\par h/o stage 3 breast ca 3 yrs ago. Had bilat mastectomies. 8/2016.\par Had recent colonoscopy.\mallory Works at [employer].  [FreeTextEntry1] : establish care

## 2019-11-13 NOTE — PHYSICAL EXAM
[Normal] : soft, non-tender, non-distended, no masses palpated, no HSM and normal bowel sounds [No Edema] : there was no peripheral edema

## 2019-11-13 NOTE — HEALTH RISK ASSESSMENT
[] : No [No] : No [Patient reported colonoscopy was normal] : Patient reported colonoscopy was normal [Change in mental status noted] : No change in mental status noted [Language] : denies difficulty with language [ColonoscopyDate] : 06/19

## 2019-11-14 ENCOUNTER — APPOINTMENT (OUTPATIENT)
Dept: ORTHOPEDIC SURGERY | Facility: CLINIC | Age: 64
End: 2019-11-14
Payer: COMMERCIAL

## 2019-11-14 VITALS — HEIGHT: 64 IN | WEIGHT: 149 LBS | BODY MASS INDEX: 25.44 KG/M2

## 2019-11-14 DIAGNOSIS — M25.532 PAIN IN LEFT WRIST: ICD-10-CM

## 2019-11-14 PROCEDURE — 99203 OFFICE O/P NEW LOW 30 MIN: CPT | Mod: 25

## 2019-11-14 PROCEDURE — 29075 APPL CST ELBW FNGR SHORT ARM: CPT | Mod: LT

## 2019-11-14 PROCEDURE — 73110 X-RAY EXAM OF WRIST: CPT | Mod: LT

## 2019-11-14 NOTE — END OF VISIT
[FreeTextEntry3] : I, Jonny Stone MD, ordering physician, have read and attest that all the information, medical decision making and discharge instructions within are true and accurate.

## 2019-11-14 NOTE — ADDENDUM
[FreeTextEntry1] : I, Mayi Copeland wrote this note acting as a scribe for Dr. Jonny Stone on Nov 14, 2019.

## 2019-11-14 NOTE — PHYSICAL EXAM
[de-identified] : Patient is WDWN, alert, and in no acute distress. Breathing is unlabored. She is grossly oriented to person, place, and time. \par \par Left Wrist: Moderate swelling throughout the dorsal surface of the hand and digits. There is tenderness over the radial and ulnar aspect of the wrists. The ROM is limited in all planes due to pain, particularly with supination. There is no joint instability on provocative testing. Sensation is normal to light touch.\par Strength: extension, flexion, ulnar deviation and radial deviation 5/5.  [de-identified] : AP, lateral and oblique views of the left wrist + navicular view were obtained today and revealed evidence of a transverse linear lucency indicating a nondisplaced distal radius fracture. No evidence of scaphoid fracture. Additionally, there is an osseous deformity at the lateral distal portion of the radius.

## 2019-11-14 NOTE — RETURN TO WORK/SCHOOL
[FreeTextEntry1] : Ms. LUCIANA ELIAS was seen in the office today on 11/14/2019 and evaluated by me for an Orthopedic visit regarding a left wrist injury. Please be advised that she has been allowed by me to return to work, full duty. \par \par Sincerely, \par Jonny Stone MD

## 2019-11-14 NOTE — CONSULT LETTER
[Dear  ___] : Dear  [unfilled], [Consult Letter:] : I had the pleasure of evaluating your patient, [unfilled]. [Please see my note below.] : Please see my note below. [Consult Closing:] : Thank you very much for allowing me to participate in the care of this patient.  If you have any questions, please do not hesitate to contact me. [Sincerely,] : Sincerely, [FreeTextEntry2] : STEWART DOSHI  [FreeTextEntry3] : Jonny Stone MD

## 2019-11-14 NOTE — HISTORY OF PRESENT ILLNESS
[Right] : right hand dominant [FreeTextEntry1] : Pt is a 63 y/o female with left wrist pain x 5 days.  She was taking an exercise class on Saturday 11/9/19 and she missed a step.  She fell backwards and landed on an outstretched left hand. She had pain immediately.  She went to [facility] that day.  Xrays were not taken because the MD did not think that she fractured the wrist.  She was given a wrist brace which helps.  She has difficulty with supination of the wrist.  She has pain if she lifts anything heavy.  The wrist aches at rest.  She takes Advil as needed for pain.\par She notes that in July she was walking her daughters dog and he pulled on the leash causing pain in her wrist.  She had intermittent mild pain since then.  This new injury has increased the pain.

## 2019-11-16 ENCOUNTER — LABORATORY RESULT (OUTPATIENT)
Age: 64
End: 2019-11-16

## 2019-11-18 LAB
25(OH)D3 SERPL-MCNC: 37.8 NG/ML
ALBUMIN SERPL ELPH-MCNC: 3.9 G/DL
ALP BLD-CCNC: 73 U/L
ALT SERPL-CCNC: 17 U/L
ANION GAP SERPL CALC-SCNC: 12 MMOL/L
AST SERPL-CCNC: 18 U/L
BASOPHILS # BLD AUTO: 0.04 K/UL
BASOPHILS NFR BLD AUTO: 1.1 %
BILIRUB SERPL-MCNC: 1 MG/DL
BUN SERPL-MCNC: 19 MG/DL
CALCIUM SERPL-MCNC: 9.1 MG/DL
CHLORIDE SERPL-SCNC: 106 MMOL/L
CHOLEST SERPL-MCNC: 165 MG/DL
CHOLEST/HDLC SERPL: 3 RATIO
CO2 SERPL-SCNC: 26 MMOL/L
CREAT SERPL-MCNC: 0.75 MG/DL
EOSINOPHIL # BLD AUTO: 0.19 K/UL
EOSINOPHIL NFR BLD AUTO: 5.1 %
ESTIMATED AVERAGE GLUCOSE: 114 MG/DL
FOLATE SERPL-MCNC: 11 NG/ML
GLUCOSE SERPL-MCNC: 95 MG/DL
HBA1C MFR BLD HPLC: 5.6 %
HCT VFR BLD CALC: 42.1 %
HDLC SERPL-MCNC: 56 MG/DL
HGB BLD-MCNC: 13.7 G/DL
IMM GRANULOCYTES NFR BLD AUTO: 0.3 %
LDLC SERPL CALC-MCNC: 93 MG/DL
LYMPHOCYTES # BLD AUTO: 1.22 K/UL
LYMPHOCYTES NFR BLD AUTO: 32.9 %
MAN DIFF?: NORMAL
MCHC RBC-ENTMCNC: 31.6 PG
MCHC RBC-ENTMCNC: 32.5 GM/DL
MCV RBC AUTO: 97 FL
MONOCYTES # BLD AUTO: 0.49 K/UL
MONOCYTES NFR BLD AUTO: 13.2 %
NEUTROPHILS # BLD AUTO: 1.76 K/UL
NEUTROPHILS NFR BLD AUTO: 47.4 %
PLATELET # BLD AUTO: 148 K/UL
POTASSIUM SERPL-SCNC: 4.5 MMOL/L
PROT SERPL-MCNC: 6.3 G/DL
RBC # BLD: 4.34 M/UL
RBC # FLD: 12.8 %
SODIUM SERPL-SCNC: 144 MMOL/L
TRIGL SERPL-MCNC: 82 MG/DL
TSH SERPL-ACNC: 5.69 UIU/ML
VIT B12 SERPL-MCNC: 411 PG/ML
WBC # FLD AUTO: 3.71 K/UL

## 2019-12-16 ENCOUNTER — APPOINTMENT (OUTPATIENT)
Dept: ORTHOPEDIC SURGERY | Facility: CLINIC | Age: 64
End: 2019-12-16
Payer: COMMERCIAL

## 2019-12-16 DIAGNOSIS — S52.532A COLLES' FRACTURE OF LEFT RADIUS, INITIAL ENCOUNTER FOR CLOSED FRACTURE: ICD-10-CM

## 2019-12-16 PROCEDURE — 73110 X-RAY EXAM OF WRIST: CPT | Mod: LT

## 2019-12-16 PROCEDURE — 99213 OFFICE O/P EST LOW 20 MIN: CPT

## 2019-12-17 PROBLEM — S52.532A CLOSED COLLES' FRACTURE OF LEFT RADIUS, INITIAL ENCOUNTER: Status: ACTIVE | Noted: 2019-11-14

## 2019-12-17 NOTE — DISCUSSION/SUMMARY
[FreeTextEntry1] : Left short arm cast was removed and she was advised to gradually return to normal use of the hand. \par The patient wishes to proceed with physical therapy. A script was given. \par The patient was advised to soak the hand in warm water and Epsom salt. \par Gentle range of motion and strengthening exercises were encouraged, as tolerated by pain. \par Follow up in 6 weeks for repeat x-rays.

## 2019-12-17 NOTE — HISTORY OF PRESENT ILLNESS
[Right] : right hand dominant [FreeTextEntry1] : Pt is a 65 y/o female who returns with a left wrist fracture.  She was taking an exercise class on Saturday 11/9/19 and she missed a step.  She fell backwards and landed on an outstretched left hand. She had pain immediately.  She went to [facility] that day.  Xrays were not taken because the MD did not think that she fractured the wrist.  She was given a wrist brace which helps. X-rays of the left wrist were taken in this office 11/20/2019 and revealed there to be evidence of a transverse linear lucency indicating a nondisplaced distal radius fracture. She was placed into a short arm cast and advised to return in 4 weeks. Today she states that does not have much pain. She takes Advil as needed for pain. She is here for cast removal and repeat x-rays.

## 2019-12-17 NOTE — PHYSICAL EXAM
[de-identified] : AP, lateral and oblique views of the left wrist were obtained today and revealed a transverse linear lucency indicating a nondisplaced distal radius fracture. Fracture is fully healed at this time.  [de-identified] : Patient is WDWN, alert, and in no acute distress. Breathing is unlabored. She is grossly oriented to person, place, and time. \par \par Left Wrist: Short arm cast was removed. There is residual swelling throughout the dorsal surface of the hand and digits. There is mild tenderness over the radial and ulnar aspect of the wrists. The ROM is limited due to recent cast immobilization, particularly with supination. There is no joint instability on provocative testing. Sensation is normal to light touch.\par Strength: extension, flexion, ulnar deviation and radial deviation 5/5.

## 2019-12-17 NOTE — ADDENDUM
[FreeTextEntry1] : I, Mayi Copeland wrote this note acting as a scribe for Dr. Jonny Stone on Dec 16, 2019.

## 2020-02-20 ENCOUNTER — APPOINTMENT (OUTPATIENT)
Dept: SURGERY | Facility: CLINIC | Age: 65
End: 2020-02-20
Payer: COMMERCIAL

## 2020-02-20 PROCEDURE — 99213K: CUSTOM

## 2020-02-25 ENCOUNTER — APPOINTMENT (OUTPATIENT)
Dept: INTERNAL MEDICINE | Facility: CLINIC | Age: 65
End: 2020-02-25
Payer: COMMERCIAL

## 2020-02-25 PROCEDURE — 90750 HZV VACC RECOMBINANT IM: CPT

## 2020-02-25 PROCEDURE — 90471 IMMUNIZATION ADMIN: CPT

## 2020-04-14 ENCOUNTER — OUTPATIENT (OUTPATIENT)
Dept: OUTPATIENT SERVICES | Facility: HOSPITAL | Age: 65
LOS: 1 days | Discharge: HOME | End: 2020-04-14

## 2020-04-14 DIAGNOSIS — Z98.89 OTHER SPECIFIED POSTPROCEDURAL STATES: Chronic | ICD-10-CM

## 2020-04-14 DIAGNOSIS — C50.919 MALIGNANT NEOPLASM OF UNSPECIFIED SITE OF UNSPECIFIED FEMALE BREAST: ICD-10-CM

## 2020-04-14 DIAGNOSIS — Z90.13 ACQUIRED ABSENCE OF BILATERAL BREASTS AND NIPPLES: Chronic | ICD-10-CM

## 2020-04-21 ENCOUNTER — APPOINTMENT (OUTPATIENT)
Dept: HEMATOLOGY ONCOLOGY | Facility: CLINIC | Age: 65
End: 2020-04-21
Payer: COMMERCIAL

## 2020-04-21 PROCEDURE — 99213 OFFICE O/P EST LOW 20 MIN: CPT | Mod: 95

## 2020-04-21 NOTE — HISTORY OF PRESENT ILLNESS
[Home] : at home, [unfilled] , at the time of the visit. [Medical Office: ([facility])___] : at the medical office located in  [Patient] : the patient [Self] : self [Disease: _____________________] : Disease: [unfilled] [T: ___] : T[unfilled] [N: ___] : N[unfilled] [M: ___] : M[unfilled] [Research Protocol] : Research Protocol  [de-identified] : Patient was seen for an initial visit with me on 10/30/19 and in order to transfer her care to me from that of Dr. Hoang. the history below is as per the patient and  the AEHR notes of Dr. Hoang who left the [facility].\par \par At 61 year old female had negative mammogram (5/12/2016) except for dense breasts, ultrasound (6/22/2016) right 1 cm mass and 0.5 cm mass; left 1 cm mass. Ultrasound biopsy right (6/28/2016): moderately differentiated duct cell carcinoma with micropapillary features; left benign. Breast MRI (7/14/2016): two masses; left negative. Right axillary ultrasound biopsy (8/01/2016): positive for carcinoma in node. Staging PET/CAT (8/19/2016): post surgical changes on the right chest, left renal cyst, no metastatic disease. EKG unremarkable. Had bilateral mastectomies 8/09/2016 with right: extensive invasive duct cell carcinoma with micropapillary features, lymphatic invasion, two distinct masses -- 5.5 cm and 1.0 cm with additional foci of tumor, DCIS, 11+ nodes, ER+ [place]+ Her2/avni negative. Had immediate reconstruction. Noted to be anemic. Consistent with post operative. 8/2.57/2016 hepatitis screen negative. 9/01/2016 MUGA 61%, 10/16/2016 Finished cycle #3 dose dense AC. 11/03/2016 finished paclitaxel cycle 1/4. Tolerated drug well. has finished 4 cycles of paclitaxel. Received radiation to the chest wall and signed consent for the PALLAS trial. PET/CT 3/24/2017: no metastatic disease. Bone density  3/24/2017: spine T = -2.0. Had squamous cell skin cancer resected from right clavicle 3/10/2017. Started on anastrozole, vitamin D and calcium 3/27/2017. 4/17/2017 started on PALLAS study Arm A. Has persistently elevated CEA with no obvious source. CAT scan staging 7/15/2017 ? radiation changes in lung, 3 month follow up suggested. 0/05/2017 CEA 4.4. 10/21/2017: CAT scan staging: benign. 2/20/2018 CEA remains 4.0.8/15/2018 CEA 4. 4/10/2019 CEA 3.8. CA 27-29 13.  [de-identified] : infiltrating poorly differentiated micropapillary carcinoma, multifocal [de-identified] : ER+ [place]+ Her2/avni negative by [facility] [de-identified] : 7/11/2016 [facility] Cancer Next panel 32 genes negative. [FreeTextEntry1] : dose dense AC-T start 9/09/2016 -- finished. Started anastrozole 3/27/2017; on PALLAS ARM A (palbociclib) start 4/17/2017. [de-identified] : The pt was seen for a routine f/u telehealth visit secondary to the Covid crisis.\par \par She notes a good appetite, stable weight and excellent performance status. \par \par In the interim fell at exercise class and fractured left wrist. \par \par She denies any treatment related side effects or any new sxs.  \par  \par DEXA 8/19: osteopenia and w/o change

## 2020-04-25 ENCOUNTER — MESSAGE (OUTPATIENT)
Age: 65
End: 2020-04-25

## 2020-05-03 ENCOUNTER — APPOINTMENT (OUTPATIENT)
Dept: DISASTER EMERGENCY | Facility: CLINIC | Age: 65
End: 2020-05-03

## 2020-05-04 LAB
SARS-COV-2 IGG SERPL IA-ACNC: 0 INDEX
SARS-COV-2 IGG SERPL QL IA: NEGATIVE

## 2020-09-28 ENCOUNTER — OUTPATIENT (OUTPATIENT)
Dept: OUTPATIENT SERVICES | Facility: HOSPITAL | Age: 65
LOS: 1 days | Discharge: HOME | End: 2020-09-28

## 2020-09-28 DIAGNOSIS — C50.919 MALIGNANT NEOPLASM OF UNSPECIFIED SITE OF UNSPECIFIED FEMALE BREAST: ICD-10-CM

## 2020-09-28 DIAGNOSIS — Z98.89 OTHER SPECIFIED POSTPROCEDURAL STATES: Chronic | ICD-10-CM

## 2020-09-28 DIAGNOSIS — Z90.13 ACQUIRED ABSENCE OF BILATERAL BREASTS AND NIPPLES: Chronic | ICD-10-CM

## 2020-10-06 ENCOUNTER — APPOINTMENT (OUTPATIENT)
Dept: HEMATOLOGY ONCOLOGY | Facility: CLINIC | Age: 65
End: 2020-10-06
Payer: COMMERCIAL

## 2020-10-06 ENCOUNTER — RESULT REVIEW (OUTPATIENT)
Age: 65
End: 2020-10-06

## 2020-10-06 VITALS
RESPIRATION RATE: 16 BRPM | BODY MASS INDEX: 26.14 KG/M2 | HEART RATE: 64 BPM | SYSTOLIC BLOOD PRESSURE: 112 MMHG | WEIGHT: 151.22 LBS | DIASTOLIC BLOOD PRESSURE: 69 MMHG | OXYGEN SATURATION: 96 % | TEMPERATURE: 97.2 F | HEIGHT: 63.78 IN

## 2020-10-06 DIAGNOSIS — R97.0 ELEVATED CARCINOEMBRYONIC ANTIGEN [CEA]: ICD-10-CM

## 2020-10-06 LAB
BASOPHILS # BLD AUTO: 0.04 K/UL — SIGNIFICANT CHANGE UP (ref 0–0.2)
BASOPHILS NFR BLD AUTO: 0.7 % — SIGNIFICANT CHANGE UP (ref 0–2)
CEA SERPL-MCNC: 3.2 NG/ML
EOSINOPHIL # BLD AUTO: 0.09 K/UL — SIGNIFICANT CHANGE UP (ref 0–0.5)
EOSINOPHIL NFR BLD AUTO: 1.6 % — SIGNIFICANT CHANGE UP (ref 0–6)
HCT VFR BLD CALC: 42.3 % — SIGNIFICANT CHANGE UP (ref 34.5–45)
HGB BLD-MCNC: 13.9 G/DL — SIGNIFICANT CHANGE UP (ref 11.5–15.5)
IMM GRANULOCYTES NFR BLD AUTO: 0.4 % — SIGNIFICANT CHANGE UP (ref 0–1.5)
LYMPHOCYTES # BLD AUTO: 1.13 K/UL — SIGNIFICANT CHANGE UP (ref 1–3.3)
LYMPHOCYTES # BLD AUTO: 20.3 % — SIGNIFICANT CHANGE UP (ref 13–44)
MCHC RBC-ENTMCNC: 31.4 PG — SIGNIFICANT CHANGE UP (ref 27–34)
MCHC RBC-ENTMCNC: 32.9 G/DL — SIGNIFICANT CHANGE UP (ref 32–36)
MCV RBC AUTO: 95.7 FL — SIGNIFICANT CHANGE UP (ref 80–100)
MONOCYTES # BLD AUTO: 0.41 K/UL — SIGNIFICANT CHANGE UP (ref 0–0.9)
MONOCYTES NFR BLD AUTO: 7.3 % — SIGNIFICANT CHANGE UP (ref 2–14)
NEUTROPHILS # BLD AUTO: 3.89 K/UL — SIGNIFICANT CHANGE UP (ref 1.8–7.4)
NEUTROPHILS NFR BLD AUTO: 69.7 % — SIGNIFICANT CHANGE UP (ref 43–77)
NRBC # BLD: 0 /100 WBCS — SIGNIFICANT CHANGE UP (ref 0–0)
PLATELET # BLD AUTO: 170 K/UL — SIGNIFICANT CHANGE UP (ref 150–400)
RBC # BLD: 4.42 M/UL — SIGNIFICANT CHANGE UP (ref 3.8–5.2)
RBC # FLD: 12.5 % — SIGNIFICANT CHANGE UP (ref 10.3–14.5)
WBC # BLD: 5.58 K/UL — SIGNIFICANT CHANGE UP (ref 3.8–10.5)
WBC # FLD AUTO: 5.58 K/UL — SIGNIFICANT CHANGE UP (ref 3.8–10.5)

## 2020-10-06 PROCEDURE — 99214 OFFICE O/P EST MOD 30 MIN: CPT

## 2020-10-07 PROBLEM — R97.0 ELEVATED CEA: Status: ACTIVE | Noted: 2017-10-17

## 2020-10-07 LAB
ALBUMIN SERPL ELPH-MCNC: 4.4 G/DL
ALP BLD-CCNC: 89 U/L
ALT SERPL-CCNC: 19 U/L
ANION GAP SERPL CALC-SCNC: 14 MMOL/L
AST SERPL-CCNC: 21 U/L
BILIRUB SERPL-MCNC: 1.2 MG/DL
BUN SERPL-MCNC: 22 MG/DL
CALCIUM SERPL-MCNC: 9.4 MG/DL
CANCER AG27-29 SERPL-ACNC: 10.4 U/ML
CHLORIDE SERPL-SCNC: 103 MMOL/L
CO2 SERPL-SCNC: 24 MMOL/L
CREAT SERPL-MCNC: 0.89 MG/DL
GLUCOSE SERPL-MCNC: 100 MG/DL
POTASSIUM SERPL-SCNC: 4.6 MMOL/L
PROT SERPL-MCNC: 6.4 G/DL
SODIUM SERPL-SCNC: 140 MMOL/L

## 2020-11-03 NOTE — PHYSICAL EXAM
[Fully active, able to carry on all pre-disease performance without restriction] : Status 0 - Fully active, able to carry on all pre-disease performance without restriction [Normal] : affect appropriate [de-identified] : s/p B/L MRM with EMILIA flap reconstruction. No palp masses. B/L ax neg.

## 2020-11-03 NOTE — HISTORY OF PRESENT ILLNESS
[Disease: _____________________] : Disease: [unfilled] [T: ___] : T[unfilled] [N: ___] : N[unfilled] [M: ___] : M[unfilled] [Research Protocol] : Research Protocol  [Home] : at home, [unfilled] , at the time of the visit. [Medical Office: ([facility])___] : at the medical office located in  [Patient] : the patient [Self] : self [de-identified] : Patient was seen for an initial visit with me on 10/30/19 and in order to transfer her care to me from that of Dr. Hoang. the history below is as per the patient and  the AEHR notes of Dr. Hoang who left the [facility].\par \par At 61 year old female had negative mammogram (5/12/2016) except for dense breasts, ultrasound (6/22/2016) right 1 cm mass and 0.5 cm mass; left 1 cm mass. Ultrasound biopsy right (6/28/2016): moderately differentiated duct cell carcinoma with micropapillary features; left benign. Breast MRI (7/14/2016): two masses; left negative. Right axillary ultrasound biopsy (8/01/2016): positive for carcinoma in node. Staging PET/CAT (8/19/2016): post surgical changes on the right chest, left renal cyst, no metastatic disease. EKG unremarkable. Had bilateral mastectomies 8/09/2016 with right: extensive invasive duct cell carcinoma with micropapillary features, lymphatic invasion, two distinct masses -- 5.5 cm and 1.0 cm with additional foci of tumor, DCIS, 11+ nodes, ER+ [place]+ Her2/avni negative. Had immediate reconstruction. Noted to be anemic. Consistent with post operative. 8/2.57/2016 hepatitis screen negative. 9/01/2016 MUGA 61%, 10/16/2016 Finished cycle #3 dose dense AC. 11/03/2016 finished paclitaxel cycle 1/4. Tolerated drug well. has finished 4 cycles of paclitaxel. Received radiation to the chest wall and signed consent for the PALLAS trial. PET/CT 3/24/2017: no metastatic disease. Bone density  3/24/2017: spine T = -2.0. Had squamous cell skin cancer resected from right clavicle 3/10/2017. Started on anastrozole, vitamin D and calcium 3/27/2017. 4/17/2017 started on PALLAS study Arm A. Has persistently elevated CEA with no obvious source. CAT scan staging 7/15/2017 ? radiation changes in lung, 3 month follow up suggested. 0/05/2017 CEA 4.4. 10/21/2017: CAT scan staging: benign. 2/20/2018 CEA remains 4.0.8/15/2018 CEA 4. 4/10/2019 CEA 3.8. CA 27-29 13.  [de-identified] : infiltrating poorly differentiated micropapillary carcinoma, multifocal [de-identified] : ER+ [place]+ Her2/avni negative by CISH [de-identified] : 7/11/2016 [facility] Cancer Next panel 32 genes negative. [FreeTextEntry1] : dose dense AC-T start 9/09/2016 -- finished. Started anastrozole 3/27/2017; on PALLAS ARM A (palbociclib) start 4/17/2017. [de-identified] : The pt was seen for a routine f/u..\par \par She notes a good appetite, stable weight and excellent performance status. \par \par She denies any anastrozole treatment related side effects or any new sxs.  \par  \par DEXA 8/19: osteopenia and w/o change

## 2021-01-25 ENCOUNTER — RESULT REVIEW (OUTPATIENT)
Age: 66
End: 2021-01-25

## 2021-03-08 ENCOUNTER — APPOINTMENT (OUTPATIENT)
Dept: SURGERY | Facility: CLINIC | Age: 66
End: 2021-03-08
Payer: COMMERCIAL

## 2021-03-08 PROCEDURE — 99213K: CUSTOM

## 2021-03-22 ENCOUNTER — NON-APPOINTMENT (OUTPATIENT)
Age: 66
End: 2021-03-22

## 2021-04-01 ENCOUNTER — OUTPATIENT (OUTPATIENT)
Dept: OUTPATIENT SERVICES | Facility: HOSPITAL | Age: 66
LOS: 1 days | Discharge: HOME | End: 2021-04-01

## 2021-04-01 DIAGNOSIS — Z98.89 OTHER SPECIFIED POSTPROCEDURAL STATES: Chronic | ICD-10-CM

## 2021-04-01 DIAGNOSIS — Z90.13 ACQUIRED ABSENCE OF BILATERAL BREASTS AND NIPPLES: Chronic | ICD-10-CM

## 2021-04-01 DIAGNOSIS — C50.919 MALIGNANT NEOPLASM OF UNSPECIFIED SITE OF UNSPECIFIED FEMALE BREAST: ICD-10-CM

## 2021-04-06 ENCOUNTER — APPOINTMENT (OUTPATIENT)
Dept: HEMATOLOGY ONCOLOGY | Facility: CLINIC | Age: 66
End: 2021-04-06

## 2021-04-20 ENCOUNTER — APPOINTMENT (OUTPATIENT)
Dept: HEMATOLOGY ONCOLOGY | Facility: CLINIC | Age: 66
End: 2021-04-20
Payer: COMMERCIAL

## 2021-04-20 ENCOUNTER — NON-APPOINTMENT (OUTPATIENT)
Age: 66
End: 2021-04-20

## 2021-04-20 VITALS
SYSTOLIC BLOOD PRESSURE: 118 MMHG | RESPIRATION RATE: 16 BRPM | BODY MASS INDEX: 26.98 KG/M2 | TEMPERATURE: 96.4 F | HEART RATE: 59 BPM | HEIGHT: 63.78 IN | DIASTOLIC BLOOD PRESSURE: 75 MMHG | WEIGHT: 156.09 LBS | OXYGEN SATURATION: 96 %

## 2021-04-20 PROCEDURE — 99072 ADDL SUPL MATRL&STAF TM PHE: CPT

## 2021-04-20 PROCEDURE — 99214 OFFICE O/P EST MOD 30 MIN: CPT

## 2021-04-20 NOTE — PHYSICAL EXAM
[Fully active, able to carry on all pre-disease performance without restriction] : Status 0 - Fully active, able to carry on all pre-disease performance without restriction [Normal] : affect appropriate [de-identified] : s/p B/L MRM with EMILIA flap reconstruction. No palp masses. B/L ax neg.

## 2021-04-20 NOTE — HISTORY OF PRESENT ILLNESS
[Disease: _____________________] : Disease: [unfilled] [T: ___] : T[unfilled] [N: ___] : N[unfilled] [M: ___] : M[unfilled] [Research Protocol] : Research Protocol  [de-identified] : Patient was seen for an initial visit with me on 10/30/19 and in order to transfer her care to me from that of Dr. Hoang. the history below is as per the patient and  the AEHR notes of Dr. Hoang who left the [facility].\par \par At 61 year old female had negative mammogram (5/12/2016) except for dense breasts, ultrasound (6/22/2016) right 1 cm mass and 0.5 cm mass; left 1 cm mass. Ultrasound biopsy right (6/28/2016): moderately differentiated duct cell carcinoma with micropapillary features; left benign. Breast MRI (7/14/2016): two masses; left negative. Right axillary ultrasound biopsy (8/01/2016): positive for carcinoma in node. Staging PET/CAT (8/19/2016): post surgical changes on the right chest, left renal cyst, no metastatic disease. EKG unremarkable. Had bilateral mastectomies 8/09/2016 with right: extensive invasive duct cell carcinoma with micropapillary features, lymphatic invasion, two distinct masses -- 5.5 cm and 1.0 cm with additional foci of tumor, DCIS, 11+ nodes, ER+ ID+ Her2/avni negative. Had immediate reconstruction. Noted to be anemic. Consistent with post operative. 8/2.57/2016 hepatitis screen negative. 9/01/2016 MUGA 61%, 10/16/2016 Finished cycle #3 dose dense AC. 11/03/2016 finished paclitaxel cycle 1/4. Tolerated drug well. has finished 4 cycles of paclitaxel. Received radiation to the chest wall and signed consent for the PALLAS trial. PET/CT 3/24/2017: no metastatic disease. Bone density  3/24/2017: spine T = -2.0. Had squamous cell skin cancer resected from right clavicle 3/10/2017. Started on anastrozole, vitamin D and calcium 3/27/2017. 4/17/2017 started on PALLAS study Arm A. Has persistently elevated CEA with no obvious source. CAT scan staging 7/15/2017 ? radiation changes in lung, 3 month follow up suggested. 0/05/2017 CEA 4.4. 10/21/2017: CAT scan staging: benign. 2/20/2018 CEA remains 4.0.8/15/2018 CEA 4. 4/10/2019 CEA 3.8. CA 27-29 13.  [de-identified] : infiltrating poorly differentiated micropapillary carcinoma, multifocal [de-identified] : ER+ SD+ Her2/avni negative by CISH [de-identified] : 7/11/2016 [facility] Cancer Next panel 32 genes negative. [FreeTextEntry1] : dose dense AC-T start 9/09/2016 -- finished. Started anastrozole 3/27/2017; on PALLAS ARM A (palbociclib) start 4/17/2017. [de-identified] : The pt was seen for a routine f/u.\par \par She notes a good appetite, stable weight and excellent performance status. \par \par She denies any anastrozole treatment related side effects or any new sxs.  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 Notes a good appetite stable weight and excellent performance status.  \par  \par DEXA 8/19: osteopenia and w/o change

## 2021-08-03 ENCOUNTER — RESULT REVIEW (OUTPATIENT)
Age: 66
End: 2021-08-03

## 2021-08-25 ENCOUNTER — APPOINTMENT (OUTPATIENT)
Dept: RADIOLOGY | Facility: IMAGING CENTER | Age: 66
End: 2021-08-25
Payer: COMMERCIAL

## 2021-08-25 ENCOUNTER — OUTPATIENT (OUTPATIENT)
Dept: OUTPATIENT SERVICES | Facility: HOSPITAL | Age: 66
LOS: 1 days | End: 2021-08-25
Payer: COMMERCIAL

## 2021-08-25 DIAGNOSIS — Z00.00 ENCOUNTER FOR GENERAL ADULT MEDICAL EXAMINATION WITHOUT ABNORMAL FINDINGS: ICD-10-CM

## 2021-08-25 DIAGNOSIS — Z90.13 ACQUIRED ABSENCE OF BILATERAL BREASTS AND NIPPLES: Chronic | ICD-10-CM

## 2021-08-25 DIAGNOSIS — Z98.89 OTHER SPECIFIED POSTPROCEDURAL STATES: Chronic | ICD-10-CM

## 2021-08-25 PROCEDURE — 77080 DXA BONE DENSITY AXIAL: CPT

## 2021-08-25 PROCEDURE — 77080 DXA BONE DENSITY AXIAL: CPT | Mod: 26

## 2021-08-30 ENCOUNTER — NON-APPOINTMENT (OUTPATIENT)
Age: 66
End: 2021-08-30

## 2021-09-07 ENCOUNTER — TRANSCRIPTION ENCOUNTER (OUTPATIENT)
Age: 66
End: 2021-09-07

## 2021-10-18 ENCOUNTER — NON-APPOINTMENT (OUTPATIENT)
Age: 66
End: 2021-10-18

## 2021-10-18 ENCOUNTER — OUTPATIENT (OUTPATIENT)
Dept: OUTPATIENT SERVICES | Facility: HOSPITAL | Age: 66
LOS: 1 days | Discharge: HOME | End: 2021-10-18

## 2021-10-18 DIAGNOSIS — Z98.89 OTHER SPECIFIED POSTPROCEDURAL STATES: Chronic | ICD-10-CM

## 2021-10-18 DIAGNOSIS — C50.919 MALIGNANT NEOPLASM OF UNSPECIFIED SITE OF UNSPECIFIED FEMALE BREAST: ICD-10-CM

## 2021-10-18 DIAGNOSIS — Z90.13 ACQUIRED ABSENCE OF BILATERAL BREASTS AND NIPPLES: Chronic | ICD-10-CM

## 2021-10-19 ENCOUNTER — APPOINTMENT (OUTPATIENT)
Dept: HEMATOLOGY ONCOLOGY | Facility: CLINIC | Age: 66
End: 2021-10-19
Payer: COMMERCIAL

## 2021-10-19 VITALS
DIASTOLIC BLOOD PRESSURE: 77 MMHG | HEART RATE: 64 BPM | TEMPERATURE: 97.1 F | BODY MASS INDEX: 26.98 KG/M2 | SYSTOLIC BLOOD PRESSURE: 119 MMHG | OXYGEN SATURATION: 98 % | WEIGHT: 156.09 LBS | HEIGHT: 63.78 IN | RESPIRATION RATE: 17 BRPM

## 2021-10-19 PROCEDURE — 99214 OFFICE O/P EST MOD 30 MIN: CPT

## 2021-10-19 RX ORDER — ANASTROZOLE TABLETS 1 MG/1
1 TABLET ORAL DAILY
Qty: 90 | Refills: 3 | Status: COMPLETED | COMMUNITY
Start: 2019-01-30 | End: 2022-10-14

## 2021-10-21 NOTE — HISTORY OF PRESENT ILLNESS
[Disease: _____________________] : Disease: [unfilled] [T: ___] : T[unfilled] [N: ___] : N[unfilled] [M: ___] : M[unfilled] [Research Protocol] : Research Protocol  [de-identified] : Patient was seen for an initial visit with me on 10/30/19 and in order to transfer her care to me from that of Dr. Hoang. the history below is as per the patient and  the AEHR notes of Dr. Hoang who left the [facility].\par \par At 61 year old female had negative mammogram (5/12/2016) except for dense breasts, ultrasound (6/22/2016) right 1 cm mass and 0.5 cm mass; left 1 cm mass. Ultrasound biopsy right (6/28/2016): moderately differentiated duct cell carcinoma with micropapillary features; left benign. Breast MRI (7/14/2016): two masses; left negative. Right axillary ultrasound biopsy (8/01/2016): positive for carcinoma in node. Staging PET/CAT (8/19/2016): post surgical changes on the right chest, left renal cyst, no metastatic disease. EKG unremarkable. Had bilateral mastectomies 8/09/2016 with right: extensive invasive duct cell carcinoma with micropapillary features, lymphatic invasion, two distinct masses -- 5.5 cm and 1.0 cm with additional foci of tumor, DCIS, 11+ nodes, ER+ RI+ Her2/avni negative. Had immediate reconstruction. Noted to be anemic. Consistent with post operative. 8/2.57/2016 hepatitis screen negative. 9/01/2016 MUGA 61%, 10/16/2016 Finished cycle #3 dose dense AC. 11/03/2016 finished paclitaxel cycle 1/4. Tolerated drug well. has finished 4 cycles of paclitaxel. Received radiation to the chest wall and signed consent for the PALLAS trial. PET/CT 3/24/2017: no metastatic disease. Bone density  3/24/2017: spine T = -2.0. Had squamous cell skin cancer resected from right clavicle 3/10/2017. Started on anastrozole, vitamin D and calcium 3/27/2017. 4/17/2017 started on PALLAS study Arm A. Has persistently elevated CEA with no obvious source. CAT scan staging 7/15/2017 ? radiation changes in lung, 3 month follow up suggested. 0/05/2017 CEA 4.4. 10/21/2017: CAT scan staging: benign. 2/20/2018 CEA remains 4.0.8/15/2018 CEA 4. 4/10/2019 CEA 3.8. CA 27-29 13.  [de-identified] : infiltrating poorly differentiated micropapillary carcinoma, multifocal [de-identified] : ER+ [place]+ Her2/avni negative by CISH [de-identified] : 7/11/2016 [facility] Cancer Next panel 32 genes negative. [FreeTextEntry1] : dose dense AC-T start 9/09/2016 -- finished. Started anastrozole 3/27/2017; on PALLAS ARM A (palbociclib) start 4/17/2017. [de-identified] : The pt was seen for a routine f/u.\par \par She notes a good appetite, stable weight and excellent performance status. \par \par She denies any anastrozole treatment related side effects.\par   \par She has persistent mild PN toes since chemo w/o change (grade 1)\par \par She denies all other complaints. \par \par Notes a good appetite stable weight and excellent performance status.  \par  \par DEXA 8/21: osteopenia

## 2021-10-21 NOTE — PHYSICAL EXAM
[Fully active, able to carry on all pre-disease performance without restriction] : Status 0 - Fully active, able to carry on all pre-disease performance without restriction [Normal] : affect appropriate [de-identified] : s/p B/L MRM with EMILIA flap reconstruction. No palp masses. B/L ax neg.

## 2021-11-01 ENCOUNTER — NON-APPOINTMENT (OUTPATIENT)
Age: 66
End: 2021-11-01

## 2021-11-08 ENCOUNTER — APPOINTMENT (OUTPATIENT)
Dept: INTERNAL MEDICINE | Facility: CLINIC | Age: 66
End: 2021-11-08
Payer: COMMERCIAL

## 2021-11-08 ENCOUNTER — NON-APPOINTMENT (OUTPATIENT)
Age: 66
End: 2021-11-08

## 2021-11-08 VITALS
BODY MASS INDEX: 26.79 KG/M2 | WEIGHT: 155 LBS | RESPIRATION RATE: 14 BRPM | OXYGEN SATURATION: 97 % | TEMPERATURE: 97.3 F | HEIGHT: 63.78 IN | HEART RATE: 74 BPM | SYSTOLIC BLOOD PRESSURE: 120 MMHG | DIASTOLIC BLOOD PRESSURE: 70 MMHG

## 2021-11-08 PROCEDURE — 90732 PPSV23 VACC 2 YRS+ SUBQ/IM: CPT

## 2021-11-08 PROCEDURE — 93000 ELECTROCARDIOGRAM COMPLETE: CPT

## 2021-11-08 PROCEDURE — 99397 PER PM REEVAL EST PAT 65+ YR: CPT | Mod: 25

## 2021-11-08 PROCEDURE — G0009: CPT

## 2021-11-08 NOTE — HEALTH RISK ASSESSMENT
[Never (0 pts)] : Never (0 points) [No] : In the past 12 months have you used drugs other than those required for medical reasons? No [No falls in past year] : Patient reported no falls in the past year [0] : 2) Feeling down, depressed, or hopeless: Not at all (0) [PHQ-2 Negative - No further assessment needed] : PHQ-2 Negative - No further assessment needed [Patient reported colonoscopy was normal] : Patient reported colonoscopy was normal [] : No [ColonoscopyDate] : 06/19

## 2021-11-09 LAB
25(OH)D3 SERPL-MCNC: 40.2 NG/ML
ALBUMIN SERPL ELPH-MCNC: 4.3 G/DL
ALP BLD-CCNC: 82 U/L
ALT SERPL-CCNC: 14 U/L
ANION GAP SERPL CALC-SCNC: 10 MMOL/L
APPEARANCE: CLEAR
AST SERPL-CCNC: 21 U/L
BACTERIA: NEGATIVE
BASOPHILS # BLD AUTO: 0.06 K/UL
BASOPHILS NFR BLD AUTO: 0.9 %
BILIRUB SERPL-MCNC: 0.9 MG/DL
BILIRUBIN URINE: NEGATIVE
BLOOD URINE: NEGATIVE
BUN SERPL-MCNC: 17 MG/DL
CALCIUM SERPL-MCNC: 9.4 MG/DL
CHLORIDE SERPL-SCNC: 104 MMOL/L
CHOLEST SERPL-MCNC: 196 MG/DL
CO2 SERPL-SCNC: 27 MMOL/L
COLOR: NORMAL
CREAT SERPL-MCNC: 0.78 MG/DL
EOSINOPHIL # BLD AUTO: 0.12 K/UL
EOSINOPHIL NFR BLD AUTO: 1.8 %
ESTIMATED AVERAGE GLUCOSE: 114 MG/DL
FOLATE SERPL-MCNC: 11.6 NG/ML
GLUCOSE QUALITATIVE U: NEGATIVE
GLUCOSE SERPL-MCNC: 91 MG/DL
HBA1C MFR BLD HPLC: 5.6 %
HCT VFR BLD CALC: 43.3 %
HDLC SERPL-MCNC: 70 MG/DL
HGB BLD-MCNC: 14.1 G/DL
HYALINE CASTS: 1 /LPF
IMM GRANULOCYTES NFR BLD AUTO: 0.2 %
KETONES URINE: NEGATIVE
LDLC SERPL CALC-MCNC: 109 MG/DL
LEUKOCYTE ESTERASE URINE: NEGATIVE
LYMPHOCYTES # BLD AUTO: 1.63 K/UL
LYMPHOCYTES NFR BLD AUTO: 24.6 %
MAN DIFF?: NORMAL
MCHC RBC-ENTMCNC: 31.4 PG
MCHC RBC-ENTMCNC: 32.6 GM/DL
MCV RBC AUTO: 96.4 FL
MICROSCOPIC-UA: NORMAL
MONOCYTES # BLD AUTO: 0.52 K/UL
MONOCYTES NFR BLD AUTO: 7.9 %
NEUTROPHILS # BLD AUTO: 4.28 K/UL
NEUTROPHILS NFR BLD AUTO: 64.6 %
NITRITE URINE: NEGATIVE
NONHDLC SERPL-MCNC: 126 MG/DL
PH URINE: 7
PLATELET # BLD AUTO: 195 K/UL
POTASSIUM SERPL-SCNC: 4.3 MMOL/L
PROT SERPL-MCNC: 6.4 G/DL
PROTEIN URINE: NEGATIVE
RBC # BLD: 4.49 M/UL
RBC # FLD: 12.5 %
RED BLOOD CELLS URINE: 2 /HPF
SODIUM SERPL-SCNC: 141 MMOL/L
SPECIFIC GRAVITY URINE: 1.01
SQUAMOUS EPITHELIAL CELLS: 1 /HPF
TRIGL SERPL-MCNC: 86 MG/DL
TSH SERPL-ACNC: 3.02 UIU/ML
URATE SERPL-MCNC: 3.1 MG/DL
UROBILINOGEN URINE: NORMAL
VIT B12 SERPL-MCNC: 549 PG/ML
WBC # FLD AUTO: 6.62 K/UL
WHITE BLOOD CELLS URINE: 1 /HPF

## 2021-11-29 ENCOUNTER — RESULT REVIEW (OUTPATIENT)
Age: 66
End: 2021-11-29

## 2022-02-01 ENCOUNTER — RESULT REVIEW (OUTPATIENT)
Age: 67
End: 2022-02-01

## 2022-04-04 ENCOUNTER — APPOINTMENT (OUTPATIENT)
Dept: SURGERY | Facility: CLINIC | Age: 67
End: 2022-04-04
Payer: COMMERCIAL

## 2022-04-04 PROCEDURE — 99213K: CUSTOM

## 2022-04-13 ENCOUNTER — OUTPATIENT (OUTPATIENT)
Dept: OUTPATIENT SERVICES | Facility: HOSPITAL | Age: 67
LOS: 1 days | Discharge: HOME | End: 2022-04-13

## 2022-04-13 DIAGNOSIS — Z98.89 OTHER SPECIFIED POSTPROCEDURAL STATES: Chronic | ICD-10-CM

## 2022-04-13 DIAGNOSIS — Z90.13 ACQUIRED ABSENCE OF BILATERAL BREASTS AND NIPPLES: Chronic | ICD-10-CM

## 2022-04-13 DIAGNOSIS — C50.919 MALIGNANT NEOPLASM OF UNSPECIFIED SITE OF UNSPECIFIED FEMALE BREAST: ICD-10-CM

## 2022-04-19 ENCOUNTER — APPOINTMENT (OUTPATIENT)
Dept: HEMATOLOGY ONCOLOGY | Facility: CLINIC | Age: 67
End: 2022-04-19
Payer: COMMERCIAL

## 2022-04-19 VITALS
RESPIRATION RATE: 14 BRPM | OXYGEN SATURATION: 96 % | HEART RATE: 66 BPM | WEIGHT: 156.53 LBS | BODY MASS INDEX: 27.05 KG/M2 | TEMPERATURE: 96.8 F | SYSTOLIC BLOOD PRESSURE: 124 MMHG | DIASTOLIC BLOOD PRESSURE: 76 MMHG

## 2022-04-19 PROCEDURE — 99214 OFFICE O/P EST MOD 30 MIN: CPT

## 2022-04-19 RX ORDER — UBIDECARENONE/VIT E ACET 100MG-5
1000 CAPSULE ORAL
Refills: 0 | Status: DISCONTINUED | COMMUNITY
End: 2022-04-19

## 2022-04-19 RX ORDER — MAGNESIUM OXIDE/MAG AA CHELATE 300 MG
CAPSULE ORAL
Refills: 0 | Status: ACTIVE | COMMUNITY

## 2022-04-19 NOTE — PHYSICAL EXAM
[Fully active, able to carry on all pre-disease performance without restriction] : Status 0 - Fully active, able to carry on all pre-disease performance without restriction [Normal] : affect appropriate [de-identified] : s/p B/L MRM with EMILIA flap reconstruction. No palp masses. B/L ax neg.

## 2022-04-19 NOTE — HISTORY OF PRESENT ILLNESS
[Disease: _____________________] : Disease: [unfilled] [T: ___] : T[unfilled] [N: ___] : N[unfilled] [M: ___] : M[unfilled] [Research Protocol] : Research Protocol  [de-identified] : Patient was seen for an initial visit with me on 10/30/19 and in order to transfer her care to me from that of Dr. Hoang. the history below is as per the patient and  the AEHR notes of Dr. Hoang who left the [facility].\par \par At 61 year old female had negative mammogram (5/12/2016) except for dense breasts, ultrasound (6/22/2016) right 1 cm mass and 0.5 cm mass; left 1 cm mass. Ultrasound biopsy right (6/28/2016): moderately differentiated duct cell carcinoma with micropapillary features; left benign. Breast MRI (7/14/2016): two masses; left negative. Right axillary ultrasound biopsy (8/01/2016): positive for carcinoma in node. Staging PET/CAT (8/19/2016): post surgical changes on the right chest, left renal cyst, no metastatic disease. EKG unremarkable. Had bilateral mastectomies 8/09/2016 with right: extensive invasive duct cell carcinoma with micropapillary features, lymphatic invasion, two distinct masses -- 5.5 cm and 1.0 cm with additional foci of tumor, DCIS, 11+ nodes, ER+ [place]+ Her2/avni negative. Had immediate reconstruction. Noted to be anemic. Consistent with post operative. 8/2.57/2016 hepatitis screen negative. 9/01/2016 MUGA 61%, 10/16/2016 Finished cycle #3 dose dense AC. 11/03/2016 finished paclitaxel cycle 1/4. Tolerated drug well. has finished 4 cycles of paclitaxel. Received radiation to the chest wall and signed consent for the PALLAS trial. PET/CT 3/24/2017: no metastatic disease. Bone density  3/24/2017: spine T = -2.0. Had squamous cell skin cancer resected from right clavicle 3/10/2017. Started on anastrozole, vitamin D and calcium 3/27/2017. 4/17/2017 started on PALLAS study Arm A. Has persistently elevated CEA with no obvious source. CAT scan staging 7/15/2017 ? radiation changes in lung, 3 month follow up suggested. 0/05/2017 CEA 4.4. 10/21/2017: CAT scan staging: benign. 2/20/2018 CEA remains 4.0.8/15/2018 CEA 4. 4/10/2019 CEA 3.8. CA 27-29 13.  [de-identified] : infiltrating poorly differentiated micropapillary carcinoma, multifocal [de-identified] : ER+ [place]+ Her2/avni negative by CISH [de-identified] : 7/11/2016 [facility] Cancer Next panel 32 genes negative. [FreeTextEntry1] : dose dense AC-T start 9/09/2016 -- finished. Started anastrozole 3/27/2017; on PALLAS ARM A (palbociclib) start 4/17/2017. [de-identified] : The pt was seen for a routine f/u.\par \par She notes a good appetite, stable weight and excellent performance status. \par \par She denies any anastrozole treatment related side effects.\par   \par She has persistent mild PN toes since chemo w/o change (grade 1).\par \par She has baseline mild arthralgias w/o change from pre AI baseline. \par \par She denies all other complaints. \par \par Notes a good appetite stable weight and excellent performance status.  \par  \par DEXA 8/21: osteopenia

## 2022-04-25 ENCOUNTER — APPOINTMENT (OUTPATIENT)
Dept: HEMATOLOGY ONCOLOGY | Facility: CLINIC | Age: 67
End: 2022-04-25

## 2022-04-25 ENCOUNTER — RESULT REVIEW (OUTPATIENT)
Age: 67
End: 2022-04-25

## 2022-04-25 LAB
BASOPHILS # BLD AUTO: 0.04 K/UL — SIGNIFICANT CHANGE UP (ref 0–0.2)
BASOPHILS NFR BLD AUTO: 0.8 % — SIGNIFICANT CHANGE UP (ref 0–2)
EOSINOPHIL # BLD AUTO: 0.14 K/UL — SIGNIFICANT CHANGE UP (ref 0–0.5)
EOSINOPHIL NFR BLD AUTO: 2.7 % — SIGNIFICANT CHANGE UP (ref 0–6)
HCT VFR BLD CALC: 45.1 % — HIGH (ref 34.5–45)
HGB BLD-MCNC: 14.8 G/DL — SIGNIFICANT CHANGE UP (ref 11.5–15.5)
IMM GRANULOCYTES NFR BLD AUTO: 0 % — SIGNIFICANT CHANGE UP (ref 0–1.5)
LYMPHOCYTES # BLD AUTO: 1 K/UL — SIGNIFICANT CHANGE UP (ref 1–3.3)
LYMPHOCYTES # BLD AUTO: 19.2 % — SIGNIFICANT CHANGE UP (ref 13–44)
MCHC RBC-ENTMCNC: 30.8 PG — SIGNIFICANT CHANGE UP (ref 27–34)
MCHC RBC-ENTMCNC: 32.8 G/DL — SIGNIFICANT CHANGE UP (ref 32–36)
MCV RBC AUTO: 94 FL — SIGNIFICANT CHANGE UP (ref 80–100)
MONOCYTES # BLD AUTO: 0.38 K/UL — SIGNIFICANT CHANGE UP (ref 0–0.9)
MONOCYTES NFR BLD AUTO: 7.3 % — SIGNIFICANT CHANGE UP (ref 2–14)
NEUTROPHILS # BLD AUTO: 3.65 K/UL — SIGNIFICANT CHANGE UP (ref 1.8–7.4)
NEUTROPHILS NFR BLD AUTO: 70 % — SIGNIFICANT CHANGE UP (ref 43–77)
NRBC # BLD: 0 /100 WBCS — SIGNIFICANT CHANGE UP (ref 0–0)
PLATELET # BLD AUTO: 160 K/UL — SIGNIFICANT CHANGE UP (ref 150–400)
RBC # BLD: 4.8 M/UL — SIGNIFICANT CHANGE UP (ref 3.8–5.2)
RBC # FLD: 12.8 % — SIGNIFICANT CHANGE UP (ref 10.3–14.5)
WBC # BLD: 5.21 K/UL — SIGNIFICANT CHANGE UP (ref 3.8–10.5)
WBC # FLD AUTO: 5.21 K/UL — SIGNIFICANT CHANGE UP (ref 3.8–10.5)

## 2022-10-12 ENCOUNTER — OUTPATIENT (OUTPATIENT)
Dept: OUTPATIENT SERVICES | Facility: HOSPITAL | Age: 67
LOS: 1 days | Discharge: HOME | End: 2022-10-12

## 2022-10-12 DIAGNOSIS — C50.919 MALIGNANT NEOPLASM OF UNSPECIFIED SITE OF UNSPECIFIED FEMALE BREAST: ICD-10-CM

## 2022-10-12 DIAGNOSIS — Z90.13 ACQUIRED ABSENCE OF BILATERAL BREASTS AND NIPPLES: Chronic | ICD-10-CM

## 2022-10-12 DIAGNOSIS — Z98.89 OTHER SPECIFIED POSTPROCEDURAL STATES: Chronic | ICD-10-CM

## 2022-10-19 ENCOUNTER — APPOINTMENT (OUTPATIENT)
Dept: HEMATOLOGY ONCOLOGY | Facility: CLINIC | Age: 67
End: 2022-10-19

## 2022-10-19 ENCOUNTER — RESULT REVIEW (OUTPATIENT)
Age: 67
End: 2022-10-19

## 2022-10-19 ENCOUNTER — OUTPATIENT (OUTPATIENT)
Dept: OUTPATIENT SERVICES | Facility: HOSPITAL | Age: 67
LOS: 1 days | Discharge: HOME | End: 2022-10-19

## 2022-10-19 VITALS
DIASTOLIC BLOOD PRESSURE: 77 MMHG | TEMPERATURE: 96.8 F | HEART RATE: 72 BPM | HEIGHT: 63.78 IN | BODY MASS INDEX: 26.67 KG/M2 | RESPIRATION RATE: 16 BRPM | OXYGEN SATURATION: 99 % | SYSTOLIC BLOOD PRESSURE: 128 MMHG | WEIGHT: 154.32 LBS

## 2022-10-19 DIAGNOSIS — Z98.89 OTHER SPECIFIED POSTPROCEDURAL STATES: Chronic | ICD-10-CM

## 2022-10-19 DIAGNOSIS — C50.919 MALIGNANT NEOPLASM OF UNSPECIFIED SITE OF UNSPECIFIED FEMALE BREAST: ICD-10-CM

## 2022-10-19 DIAGNOSIS — Z90.13 ACQUIRED ABSENCE OF BILATERAL BREASTS AND NIPPLES: Chronic | ICD-10-CM

## 2022-10-19 LAB
BASOPHILS # BLD AUTO: 0.05 K/UL — SIGNIFICANT CHANGE UP (ref 0–0.2)
BASOPHILS NFR BLD AUTO: 0.9 % — SIGNIFICANT CHANGE UP (ref 0–2)
EOSINOPHIL # BLD AUTO: 0.17 K/UL — SIGNIFICANT CHANGE UP (ref 0–0.5)
EOSINOPHIL NFR BLD AUTO: 3.2 % — SIGNIFICANT CHANGE UP (ref 0–6)
HCT VFR BLD CALC: 46.4 % — HIGH (ref 34.5–45)
HGB BLD-MCNC: 15.2 G/DL — SIGNIFICANT CHANGE UP (ref 11.5–15.5)
IMM GRANULOCYTES NFR BLD AUTO: 0.2 % — SIGNIFICANT CHANGE UP (ref 0–0.9)
LYMPHOCYTES # BLD AUTO: 1.38 K/UL — SIGNIFICANT CHANGE UP (ref 1–3.3)
LYMPHOCYTES # BLD AUTO: 25.9 % — SIGNIFICANT CHANGE UP (ref 13–44)
MCHC RBC-ENTMCNC: 30.7 PG — SIGNIFICANT CHANGE UP (ref 27–34)
MCHC RBC-ENTMCNC: 32.8 G/DL — SIGNIFICANT CHANGE UP (ref 32–36)
MCV RBC AUTO: 93.7 FL — SIGNIFICANT CHANGE UP (ref 80–100)
MONOCYTES # BLD AUTO: 0.48 K/UL — SIGNIFICANT CHANGE UP (ref 0–0.9)
MONOCYTES NFR BLD AUTO: 9 % — SIGNIFICANT CHANGE UP (ref 2–14)
NEUTROPHILS # BLD AUTO: 3.23 K/UL — SIGNIFICANT CHANGE UP (ref 1.8–7.4)
NEUTROPHILS NFR BLD AUTO: 60.8 % — SIGNIFICANT CHANGE UP (ref 43–77)
NRBC # BLD: 0 /100 WBCS — SIGNIFICANT CHANGE UP (ref 0–0)
PLATELET # BLD AUTO: 177 K/UL — SIGNIFICANT CHANGE UP (ref 150–400)
RBC # BLD: 4.95 M/UL — SIGNIFICANT CHANGE UP (ref 3.8–5.2)
RBC # FLD: 12.6 % — SIGNIFICANT CHANGE UP (ref 10.3–14.5)
WBC # BLD: 5.32 K/UL — SIGNIFICANT CHANGE UP (ref 3.8–10.5)
WBC # FLD AUTO: 5.32 K/UL — SIGNIFICANT CHANGE UP (ref 3.8–10.5)

## 2022-10-19 PROCEDURE — 99214 OFFICE O/P EST MOD 30 MIN: CPT

## 2022-10-19 NOTE — HISTORY OF PRESENT ILLNESS
[Disease: _____________________] : Disease: [unfilled] [T: ___] : T[unfilled] [N: ___] : N[unfilled] [M: ___] : M[unfilled] [Research Protocol] : Research Protocol  [de-identified] : Patient was seen for an initial visit with me on 10/30/19 and in order to transfer her care to me from that of Dr. Hoang. the history below is as per the patient and  the AEHR notes of Dr. Hoang who left the [facility].\par \par At 61 year old female had negative mammogram (5/12/2016) except for dense breasts, ultrasound (6/22/2016) right 1 cm mass and 0.5 cm mass; left 1 cm mass. Ultrasound biopsy right (6/28/2016): moderately differentiated duct cell carcinoma with micropapillary features; left benign. Breast MRI (7/14/2016): two masses; left negative. Right axillary ultrasound biopsy (8/01/2016): positive for carcinoma in node. Staging PET/CAT (8/19/2016): post surgical changes on the right chest, left renal cyst, no metastatic disease. EKG unremarkable. Had bilateral mastectomies 8/09/2016 with right: extensive invasive duct cell carcinoma with micropapillary features, lymphatic invasion, two distinct masses -- 5.5 cm and 1.0 cm with additional foci of tumor, DCIS, 11+ nodes, ER+ [place]+ Her2/avni negative. Had immediate reconstruction. Noted to be anemic. Consistent with post operative. 8/2.57/2016 hepatitis screen negative. 9/01/2016 MUGA 61%, 10/16/2016 Finished cycle #3 dose dense AC. 11/03/2016 finished paclitaxel cycle 1/4. Tolerated drug well. has finished 4 cycles of paclitaxel. Received radiation to the chest wall and signed consent for the PALLAS trial. PET/CT 3/24/2017: no metastatic disease. Bone density  3/24/2017: spine T = -2.0. Had squamous cell skin cancer resected from right clavicle 3/10/2017. Started on anastrozole, vitamin D and calcium 3/27/2017. 4/17/2017 started on PALLAS study Arm A. Has persistently elevated CEA with no obvious source. CAT scan staging 7/15/2017 ? radiation changes in lung, 3 month follow up suggested. 0/05/2017 CEA 4.4. 10/21/2017: CAT scan staging: benign. 2/20/2018 CEA remains 4.0.8/15/2018 CEA 4. 4/10/2019 CEA 3.8. CA 27-29 13.  [de-identified] : infiltrating poorly differentiated micropapillary carcinoma, multifocal [de-identified] : ER+ RI+ Her2/avni negative by CISH [de-identified] : 7/11/2016 [facility] Cancer Next panel 32 genes negative. [FreeTextEntry1] : dose dense AC-T start 9/09/2016 -- finished. Started anastrozole 3/27/2017; on PALLAS ARM A (palbociclib) start 4/17/2017. [de-identified] : The pt was seen for a routine f/u.\par \par She notes a good appetite, stable weight and excellent performance status. \par \par She denies any anastrozole treatment related side effects.\par   \par She has persistent mild CIPN toes since chemo w/o change (grade 1).\par \par She has baseline mild arthralgias w/o change from pre AI baseline- predom early AM awakening stiffness that resolves after moving . \par \par She denies all other complaints. \par \par Notes a good appetite stable weight and excellent performance status.  Exercises 5 days/wk\par  \par DEXA 8/21: osteopenia

## 2022-10-19 NOTE — PHYSICAL EXAM
[Fully active, able to carry on all pre-disease performance without restriction] : Status 0 - Fully active, able to carry on all pre-disease performance without restriction [Normal] : affect appropriate [de-identified] : s/p B/L MRM with EMILIA flap reconstruction. No palp masses. B/L ax neg.

## 2022-10-19 NOTE — REASON FOR VISIT
[Follow-Up Visit] : a follow-up [Spouse] : spouse [FreeTextEntry2] : Locally advanced ER+ Her2/avni- breast cancer s/p resection; 11+ nodes; S/P dose dense AC-T. \par PALLAS ARM A.

## 2022-10-21 LAB
CANCER AG27-29 SERPL-ACNC: 12.5 U/ML
CEA SERPL-MCNC: 2.5 NG/ML

## 2022-11-03 ENCOUNTER — APPOINTMENT (OUTPATIENT)
Dept: CT IMAGING | Facility: CLINIC | Age: 67
End: 2022-11-03

## 2022-11-03 ENCOUNTER — RESULT REVIEW (OUTPATIENT)
Age: 67
End: 2022-11-03

## 2022-11-03 ENCOUNTER — APPOINTMENT (OUTPATIENT)
Dept: ORTHOPEDIC SURGERY | Facility: CLINIC | Age: 67
End: 2022-11-03

## 2022-11-03 ENCOUNTER — OUTPATIENT (OUTPATIENT)
Dept: OUTPATIENT SERVICES | Facility: HOSPITAL | Age: 67
LOS: 1 days | End: 2022-11-03
Payer: MEDICARE

## 2022-11-03 DIAGNOSIS — Z90.13 ACQUIRED ABSENCE OF BILATERAL BREASTS AND NIPPLES: Chronic | ICD-10-CM

## 2022-11-03 DIAGNOSIS — S42.254A NONDISPLACED FRACTURE OF GREATER TUBEROSITY OF RIGHT HUMERUS, INITIAL ENCOUNTER FOR CLOSED FRACTURE: ICD-10-CM

## 2022-11-03 DIAGNOSIS — Z98.89 OTHER SPECIFIED POSTPROCEDURAL STATES: Chronic | ICD-10-CM

## 2022-11-03 PROCEDURE — 76376 3D RENDER W/INTRP POSTPROCES: CPT

## 2022-11-03 PROCEDURE — 73200 CT UPPER EXTREMITY W/O DYE: CPT | Mod: 26,RT,MH

## 2022-11-03 PROCEDURE — 23620 CLTX GR HMRL TBRS FX WO MNPJ: CPT

## 2022-11-03 PROCEDURE — A4565: CPT

## 2022-11-03 PROCEDURE — 73030 X-RAY EXAM OF SHOULDER: CPT | Mod: RT

## 2022-11-03 PROCEDURE — 73200 CT UPPER EXTREMITY W/O DYE: CPT

## 2022-11-03 PROCEDURE — 99203 OFFICE O/P NEW LOW 30 MIN: CPT | Mod: 25

## 2022-11-03 PROCEDURE — 76376 3D RENDER W/INTRP POSTPROCES: CPT | Mod: 26

## 2022-11-03 NOTE — HISTORY OF PRESENT ILLNESS
[Right Arm] : right arm [Gradual] : gradual [Sudden] : sudden [6] : 6 [5] : 5 [Burning] : burning [Shooting] : shooting [Stabbing] : stabbing [Intermittent] : intermittent [Rest] : rest [Exercising] : exercising [de-identified] : 11/3/22  Initial visit for this 67 year female RHD playing pickleball last night, tripped and landed directly on rt shoulder. C/o acute pain and swelling since. Applied ice, had wake up pain last night.\par PMH: No prior issues.\par           s/p breast Ca in 2016, disease free since. [] : Post Surgical Visit: no [FreeTextEntry1] : right shoulder  [FreeTextEntry5] : Pt fell on her shoulder yesterday while playing pickle ball, pt is unable to move her arm  [de-identified] : none

## 2022-11-03 NOTE — PHYSICAL EXAM
[Able to Communicate] : able to communicate [Well Developed] : well developed [Well Nourished] : well nourished [Right] : right shoulder [Fracture] : Fracture [The fracture is in acceptable alignment. There is progression in healing seen] : The fracture is in acceptable alignment. There is progression in healing seen [de-identified] : average [] : no sensory deficits [FreeTextEntry9] : Cannot perform rom due to pain [FreeTextEntry1] : Hairline fracture greater tuberosity.

## 2022-11-07 ENCOUNTER — APPOINTMENT (OUTPATIENT)
Dept: ORTHOPEDIC SURGERY | Facility: CLINIC | Age: 67
End: 2022-11-07

## 2022-11-07 VITALS — BODY MASS INDEX: 27.29 KG/M2 | HEIGHT: 63 IN | WEIGHT: 154 LBS

## 2022-11-07 DIAGNOSIS — Z78.9 OTHER SPECIFIED HEALTH STATUS: ICD-10-CM

## 2022-11-07 PROCEDURE — 99024 POSTOP FOLLOW-UP VISIT: CPT

## 2022-11-07 NOTE — PHYSICAL EXAM
[Able to Communicate] : able to communicate [Well Developed] : well developed [Well Nourished] : well nourished [Right] : right shoulder [Fracture] : Fracture [The fracture is in acceptable alignment. There is progression in healing seen] : The fracture is in acceptable alignment. There is progression in healing seen [de-identified] : average [] : no sensory deficits [FreeTextEntry9] : Cannot perform rom due to pain [FreeTextEntry1] : Hairline fracture greater tuberosity.

## 2022-11-07 NOTE — HISTORY OF PRESENT ILLNESS
[Right Arm] : right arm [Gradual] : gradual [Sudden] : sudden [5] : 5 [0] : 0 [Burning] : burning [Shooting] : shooting [Stabbing] : stabbing [Intermittent] : intermittent [Rest] : rest [Exercising] : exercising [] : Post Surgical Visit: no [FreeTextEntry1] : right shoulder  [FreeTextEntry5] : Pt fell on her shoulder yesterday while playing pickle ball, pt is unable to move her arm  [de-identified] : none  [de-identified] : 11/07/22:  Returns today for right shoulder CT scan results.\par \par IMPRESSION:\par 1.  Minimally displaced greater tuberosity fracture and nondisplaced fracture of the lesser tuberosity.  \par 2.  Likely nondisplaced fracture of the surgical neck.\par ____________________________\par 11/3/22  Initial visit for this 67 year female RHD playing pickleball last night, tripped and landed directly on rt shoulder. C/o acute pain and swelling since. Applied ice, had wake up pain last night.\par PMH: No prior issues.\par           s/p breast Ca in 2016, disease free since.

## 2022-11-07 NOTE — HISTORY OF PRESENT ILLNESS
[Right Arm] : right arm [Gradual] : gradual [Sudden] : sudden [5] : 5 [0] : 0 [Burning] : burning [Shooting] : shooting [Stabbing] : stabbing [Intermittent] : intermittent [Rest] : rest [Exercising] : exercising [] : Post Surgical Visit: no [FreeTextEntry1] : right shoulder  [FreeTextEntry5] : Pt fell on her shoulder yesterday while playing pickle ball, pt is unable to move her arm  [de-identified] : none  [de-identified] : 11/07/22:  Returns today for right shoulder CT scan results.\par \par IMPRESSION:\par 1.  Minimally displaced greater tuberosity fracture and nondisplaced fracture of the lesser tuberosity.  \par 2.  Likely nondisplaced fracture of the surgical neck.\par ____________________________\par 11/3/22  Initial visit for this 67 year female RHD playing pickleball last night, tripped and landed directly on rt shoulder. C/o acute pain and swelling since. Applied ice, had wake up pain last night.\par PMH: No prior issues.\par           s/p breast Ca in 2016, disease free since.

## 2022-11-07 NOTE — PHYSICAL EXAM
[Able to Communicate] : able to communicate [Well Developed] : well developed [Well Nourished] : well nourished [Right] : right shoulder [Fracture] : Fracture [The fracture is in acceptable alignment. There is progression in healing seen] : The fracture is in acceptable alignment. There is progression in healing seen [de-identified] : average [] : no sensory deficits [FreeTextEntry9] : Cannot perform rom due to pain [FreeTextEntry1] : Hairline fracture greater tuberosity.

## 2022-11-21 ENCOUNTER — APPOINTMENT (OUTPATIENT)
Dept: ORTHOPEDIC SURGERY | Facility: CLINIC | Age: 67
End: 2022-11-21

## 2022-11-21 PROCEDURE — 99024 POSTOP FOLLOW-UP VISIT: CPT

## 2022-11-21 PROCEDURE — 73030 X-RAY EXAM OF SHOULDER: CPT | Mod: RT

## 2022-11-21 NOTE — HISTORY OF PRESENT ILLNESS
[Right Arm] : right arm [Gradual] : gradual [Sudden] : sudden [5] : 5 [0] : 0 [Burning] : burning [Shooting] : shooting [Stabbing] : stabbing [Intermittent] : intermittent [Rest] : rest [Exercising] : exercising [Retired] : Work status: retired [de-identified] : 11/02/22 INJURY\par \par 11/21/22:  Is now 19 days after right greater tuberosity fracture. Very little c/o pain. Taking advil prn at night. in Sling.\par \par 11/7/22   Now 5 days s/p rt greater tuberosity fx. Doing well. I arm sling. Here for CT scan results.\par \par IMPRESSION:\par Minimally displaced greater tuberosity fracture and nondisplaced fracture of the lesser tuberosity.\par Likely nondisplaced fracture of the surgical neck.\par _________________________________________\par 11/3/22  Initial visit for this 67 year female RHD playing pickleball last night, tripped and landed directly on rt shoulder. C/o acute pain and swelling since. Applied ice, had wake up pain last night.\par PMH: No prior issues.\par           s/p breast Ca in 2016, disease free since.\par \par \par 11/7/22 PT STATED SHE HAS MADE SLIGHT IMPROVEMENT WITH PAIN SINCE LAST APPT.  SHE IS WEARING SLING. HERE TO DISCUSS CT SCAN RESULTS.   [] : Post Surgical Visit: no [FreeTextEntry1] : right shoulder  [FreeTextEntry5] : Pt fell on her shoulder yesterday while playing pickle ball, pt is unable to move her arm  [de-identified] : none

## 2022-11-21 NOTE — PHYSICAL EXAM
[Normal Mood and Affect] : normal mood and affect [Able to Communicate] : able to communicate [Well Developed] : well developed [Well Nourished] : well nourished [Right] : right shoulder [The fracture is in acceptable alignment. There is progression in healing seen] : The fracture is in acceptable alignment. There is progression in healing seen [de-identified] : average [] : no sensory deficits [FreeTextEntry9] : Cannot perform rom due to pain [FreeTextEntry1] : Hairline fracture greater tuberosity. No change in position of the fracture fragments.

## 2022-12-05 ENCOUNTER — APPOINTMENT (OUTPATIENT)
Dept: ORTHOPEDIC SURGERY | Facility: CLINIC | Age: 67
End: 2022-12-05

## 2022-12-05 VITALS — WEIGHT: 154 LBS | BODY MASS INDEX: 26.29 KG/M2 | HEIGHT: 64 IN

## 2022-12-05 PROCEDURE — 73030 X-RAY EXAM OF SHOULDER: CPT | Mod: RT

## 2022-12-05 PROCEDURE — 99024 POSTOP FOLLOW-UP VISIT: CPT

## 2022-12-05 NOTE — PHYSICAL EXAM
[Normal Mood and Affect] : normal mood and affect [Able to Communicate] : able to communicate [Well Developed] : well developed [Well Nourished] : well nourished [Right] : right shoulder [The fracture is in acceptable alignment. There is progression in healing seen] : The fracture is in acceptable alignment. There is progression in healing seen [5 ___] : forward flexion 5[unfilled]/5 [5___] : external rotation 5[unfilled]/5 [de-identified] : average [] : no sensory deficits [FreeTextEntry9] : IR To T10 [FreeTextEntry1] : Healed fx. [TWNoteComboBox4] : passive forward flexion 120 degrees [de-identified] : external rotation 75 degrees

## 2022-12-05 NOTE — DISCUSSION/SUMMARY
[de-identified] : Entered by Mayi Wolff PA-C working under the supervision of Dr. Haney\par Patient seen by Mayi Wolff PA-C under the supervision of Dr. Haney

## 2022-12-05 NOTE — HISTORY OF PRESENT ILLNESS
[3] : 3 [0] : 0 [Dull/Aching] : dull/aching [Retired] : Work status: retired [de-identified] : 11/02/22 INJURY\par \par 12/5/22: Nearly 5 weeks s/p right greater tuberosity fx. No longer in sling. Minimal pain. Began PT last week x 3 visits so far, which has been beneficial. Has some stiffness . Taking advil prn.\par \par 11/21/22:  Is now 19 days after right greater tuberosity fracture. Very little c/o pain. Taking advil prn at night. in Sling.\par \par 11/7/22   Now 5 days s/p rt greater tuberosity fx. Doing well. I arm sling. Here for CT scan results.\par \par IMPRESSION:\par Minimally displaced greater tuberosity fracture and nondisplaced fracture of the lesser tuberosity.\par Likely nondisplaced fracture of the surgical neck.\par _________________________________________\par 11/3/22  Initial visit for this 67 year female RHD playing pickleball last night, tripped and landed directly on rt shoulder. C/o acute pain and swelling since. Applied ice, had wake up pain last night.\par PMH: No prior issues.\par           s/p breast Ca in 2016, disease free since.\par \par \par 11/7/22 PT STATED SHE HAS MADE SLIGHT IMPROVEMENT WITH PAIN SINCE LAST APPT.  SHE IS WEARING SLING. HERE TO DISCUSS CT SCAN RESULTS.   [FreeTextEntry1] : right shoulder

## 2023-01-03 ENCOUNTER — APPOINTMENT (OUTPATIENT)
Dept: ORTHOPEDIC SURGERY | Facility: CLINIC | Age: 68
End: 2023-01-03
Payer: MEDICARE

## 2023-01-03 VITALS — WEIGHT: 150 LBS | BODY MASS INDEX: 25.61 KG/M2 | HEIGHT: 64 IN

## 2023-01-03 DIAGNOSIS — S42.254D NONDISPLACED FRACTURE OF GREATER TUBEROSITY OF RIGHT HUMERUS, SUBSEQUENT ENCOUNTER FOR FRACTURE WITH ROUTINE HEALING: ICD-10-CM

## 2023-01-03 PROCEDURE — 99024 POSTOP FOLLOW-UP VISIT: CPT

## 2023-01-03 PROCEDURE — 73030 X-RAY EXAM OF SHOULDER: CPT | Mod: RT

## 2023-01-03 NOTE — HISTORY OF PRESENT ILLNESS
[de-identified] : 11/02/22 INJURY\par \par 01/03/23:  Is now nearly 9 weeks after right greater tuberosity fracture.  Doing well. feeling over 90% better. In PT 2x/week.\par \par 12/5/22: Nearly 5 weeks s/p right greater tuberosity fx. No longer in sling. Minimal pain. Began PT last week x 3 visits so far, which has been beneficial. Has some stiffness . Taking advil prn.\par \par 11/21/22:  Is now 19 days after right greater tuberosity fracture. Very little c/o pain. Taking advil prn at night. in Sling.\par \par 11/7/22   Now 5 days s/p rt greater tuberosity fx. Doing well. I arm sling. Here for CT scan results.\par \par IMPRESSION:\par Minimally displaced greater tuberosity fracture and nondisplaced fracture of the lesser tuberosity.\par Likely nondisplaced fracture of the surgical neck.\par _________________________________________\par 11/3/22  Initial visit for this 67 year female RHD playing pickleball last night, tripped and landed directly on rt shoulder. C/o acute pain and swelling since. Applied ice, had wake up pain last night.\par PMH: No prior issues.\par           s/p breast Ca in 2016, disease free since.\par \par \par 11/7/22 PT STATED SHE HAS MADE SLIGHT IMPROVEMENT WITH PAIN SINCE LAST APPT.  SHE IS WEARING SLING. HERE TO DISCUSS CT SCAN RESULTS.

## 2023-01-03 NOTE — HISTORY OF PRESENT ILLNESS
[de-identified] : 11/02/22 INJURY\par \par 01/03/23:  Is now nearly 9 weeks after right greater tuberosity fracture.  Doing well. feeling over 90% better. In PT 2x/week.\par \par 12/5/22: Nearly 5 weeks s/p right greater tuberosity fx. No longer in sling. Minimal pain. Began PT last week x 3 visits so far, which has been beneficial. Has some stiffness . Taking advil prn.\par \par 11/21/22:  Is now 19 days after right greater tuberosity fracture. Very little c/o pain. Taking advil prn at night. in Sling.\par \par 11/7/22   Now 5 days s/p rt greater tuberosity fx. Doing well. I arm sling. Here for CT scan results.\par \par IMPRESSION:\par Minimally displaced greater tuberosity fracture and nondisplaced fracture of the lesser tuberosity.\par Likely nondisplaced fracture of the surgical neck.\par _________________________________________\par 11/3/22  Initial visit for this 67 year female RHD playing pickleball last night, tripped and landed directly on rt shoulder. C/o acute pain and swelling since. Applied ice, had wake up pain last night.\par PMH: No prior issues.\par           s/p breast Ca in 2016, disease free since.\par \par \par 11/7/22 PT STATED SHE HAS MADE SLIGHT IMPROVEMENT WITH PAIN SINCE LAST APPT.  SHE IS WEARING SLING. HERE TO DISCUSS CT SCAN RESULTS.

## 2023-01-03 NOTE — PHYSICAL EXAM
[Normal Mood and Affect] : normal mood and affect [Able to Communicate] : able to communicate [Well Developed] : well developed [Well Nourished] : well nourished [5 ___] : forward flexion 5[unfilled]/5 [5___] : external rotation 5[unfilled]/5 [Right] : right shoulder [The fracture is in acceptable alignment. There is progression in healing seen] : The fracture is in acceptable alignment. There is progression in healing seen [NL (0-90)] : full external rotation 0-90 degrees [de-identified] : average [] : no sensory deficits [FreeTextEntry9] : IR To T10 [FreeTextEntry1] : Healed fx. [TWNoteComboBox4] : passive forward flexion 165 degrees [de-identified] : external rotation 75 degrees

## 2023-01-03 NOTE — PHYSICAL EXAM
[Normal Mood and Affect] : normal mood and affect [Able to Communicate] : able to communicate [Well Developed] : well developed [Well Nourished] : well nourished [5 ___] : forward flexion 5[unfilled]/5 [5___] : external rotation 5[unfilled]/5 [Right] : right shoulder [The fracture is in acceptable alignment. There is progression in healing seen] : The fracture is in acceptable alignment. There is progression in healing seen [NL (0-90)] : full external rotation 0-90 degrees [de-identified] : average [] : no sensory deficits [FreeTextEntry9] : IR To T10 [FreeTextEntry1] : Healed fx. [TWNoteComboBox4] : passive forward flexion 165 degrees [de-identified] : external rotation 75 degrees

## 2023-03-07 NOTE — DISCUSSION/SUMMARY
Urology Clinic    Name: Lauren Nick    MRN: [id number]   YOB: 1951  Accompanied at today's visit by:self                 Chief Complaint:   nocturia          History of Present Illness:   March 7, 2023    HISTORY:   We have been following 71 year old Lauren Nick for nocturia, nocturnal enuresis, night sweats, hx of UTIs, history of breast cancer. Last seen on 2/9/23 for consultation. Last encounter reported night sweats. States this has since resolved but plans to f/u with PCP about this soon. Was recently diagnosed with covid on 2/11/23. Continues to have night time urinary symptoms but improve with avoiding fluids before bed. Voids every couple hours during the day and 2-3x/night. Denies urinary incontinence. Was referred for sleep study at last encounter. Was seen at [facility] in [facility], for a procedure to help void better right before COVID pandemic. She is unsure what the procedure was. Was suppose to have a second procedure but never underwent this procedure due to COVID pandemic. Feels like she emptied her bladder better after the procedure but now her symptoms of incomplete bladder emptying have returned. Have been unsuccessful at retrieving notes from [place] since last encounter. Patient was able to retrieve UDS however. UDS from 1/28/20 showed Adequate bladder capacity at 335mL during filling phase. During pressure flow segment of testing, patient voided 300mL with max flow rate 10ml/sec and maximum detrusor pressure of [id number]. Average flow rate of 5mL/second. Unfortunately had a PVR of 120mL at end of study. Does not appear to have true DO on UDS tracing or leakage noted, and appears to have good compliance during filling phase. She is trying to avoid bladder irritants. Has reported hx of Jina but none since moving back to [place] from [place]. Gets about 1-2 UTIs per year. Typical UTI symptoms include dysuria, lower abdominal cramping, urinary  urgency/frequency.Bowels are regular. Hx of cholecystectomy. PMH is significant for asthma, aortic valve replacement, thoracic aortic aneurysm, cardiac pacemaker. She also has hx of breast cancer s/p lumpectomy, radiation and chemo. No longer follows with oncology. Went through menopause in her 30s after her chemo treatment. Denies PMB. Former smoker of 10 years; quit at 27. Patient voices no other concerns at this time.               Allergies:     Allergies   Allergen Reactions     Penicillins Hives     Statins [Hmg-Coa-R Inhibitors]      Myalgias to multiple statins            Medications:     Current Outpatient Medications   Medication Sig     albuterol (PROAIR HFA/PROVENTIL HFA/VENTOLIN HFA) 108 (90 Base) MCG/ACT inhaler INHALE 2 PUFFS INTO THE LUNGS EVERY 6 HOURS     aspirin 81 MG EC tablet Take 81 mg by mouth daily     coenzyme Q-10 (CO-Q10) 50 MG capsule Take 2 capsules (100 mg) by mouth daily     escitalopram (LEXAPRO) 20 MG tablet TAKE 1 TABLET BY MOUTH EVERY DAY     ezetimibe (ZETIA) 10 MG tablet TAKE 1 TABLET (10 MG) BY MOUTH DAILY.     fluticasone (FLONASE) 50 MCG/ACT nasal spray Spray 2 sprays into both nostrils daily     fluticasone-salmeterol (ADVAIR) 500-50 MCG/ACT inhaler Inhale 1 puff into the lungs every 12 hours     levothyroxine (SYNTHROID/LEVOTHROID) 50 MCG tablet TAKE 1 TABLET BY MOUTH EVERY DAY     LORazepam (ATIVAN) 1 MG tablet TAKE 1 TABLET (1 MG) BY MOUTH NIGHTLY AS NEEDED FOR SLEEP     losartan (COZAAR) 25 MG tablet Take 1 tablet (25 mg) by mouth daily     metoprolol succinate ER (TOPROL-XL) 25 MG 24 hr tablet Take 1 tablet (25 mg) by mouth 2 times daily     spacer (OPTICHAMBER MURALI) holding chamber Use with inhaler     STATIN NOT PRESCRIBED (INTENTIONAL) Please choose reason not prescribed from choices below.     vitamin C (ASCORBIC ACID) 1000 MG TABS Take 1,000 mg by mouth daily     zinc gluconate 50 MG tablet Take 50 mg by mouth daily     Current Facility-Administered Medications    Medication     lidocaine (PF) (XYLOCAINE) 1 % injection 9 mL               Past  Surgical History:     Past Surgical History:   Procedure Laterality Date     ABDOMEN SURGERY  Gallbladder    removed in 2010     BIOPSY  10/2018     BREAST SURGERY  09/25/1989     CARDIAC SURGERY  03/22/2021     CHOLECYSTECTOMY  03/2010     COLONOSCOPY  03/2019     COLONOSCOPY N/A 11/10/2022    Procedure: COLONOSCOPY, WITH POLYPECTOMY AND BIOPSY;  Surgeon: Rafa Parks MD;  Location:  GI     ENT SURGERY  06/2011     EYE SURGERY  09/2015     THORACIC SURGERY  03/21    valve replacement             Physical Exam:   There were no vitals filed for this visit.  PSYCH: NAD  EYES: EOMI  NEURO: AAO x3  : Has mild erythema noted along labia majora and minora bilaterally extending to clitoral de leon and to perineum and around rectum. Skin appears dry. Vulvar changes suggestive of lichen simplex chronicus. No lesions, ulcers, bleeding, swelling, drainage, rashes noted. Urethra normal. Negative for KENYA with reduction of speculum when instructed to cough. Vaginal mucosa atrophic. No pain to palpation on external or internal exam. Good support. Strength 2/5. No obvious masses, lesions, ulcers, bleeding noted on internal or external exam.     LABS:   PVR 18mL    Creatinine   Date Value Ref Range Status   02/11/2023 0.89 0.51 - 0.95 mg/dL Final     UDS from urology clinic in [place]:   UDS from 1/28/20 showed Adequate bladder capacity at 335mL during filling phase. During pressure flow segment of testing, patient voided 300mL with max flow rate 10ml/sec and maximum detrusor pressure of [id number]. Average flow rate of 5mL/second. Unfortunately had a PVR of 120mL at end of study. Does not appear to have true DO on UDS tracing or leakage noted, and appears to have good compliance during filling phase.         Assessment and Plan:   71 year old is a pleasant female who has hx of urinary retention, nocturia, nocturnal enuresis, night sweats  (resolved), hx of UTIs, pelvic floor dysfunction, vulvar irritation. Her hx is complicated by hx of breast cancer.     Plan:   - PVR WNL  - Referred to PFPT.  - Keep appointemnt with PCP about night sweats as planned.   - Keep appointment for sleep study.  - Avoid fluids before bed  - Avoid bladder irritants  - Reviewed UDS with patient. Will try and get rest of records from [facility] in [facility].  - Given hx of urinary retention, will obtain a renal US.   - Vulvar irritation appears to be characteristic of lichen simplex chronicus. Start clobetasol ointment twice weekly for vulvar irritation.   - Plan to follow-up in 3 months with PVR.  - After discussing the assessment and plan with patient, patient verbalizes understanding and agrees to the above plan. All questions answered.     Other orders as below:  Orders Placed This Encounter   Procedures     Urinalysis Macroscopic     45 minutes spent on the date of the encounter doing chart review, review of outside records, review of test results, interpretation of tests, reviewing UDS from urology clinic in [place] (took 10 minutes), patient visit, exam and documentation.      Judy Roberts PA-C  Urology  March 7, 2023      Patient Care Team:  Wegener, Joel Daniel Irwin, MD as PCP - General (Family Medicine)  Wegener, Joel Daniel Irwin, MD as Assigned PCP  Reggie King MD as Assigned Heart and Vascular Provider  Rohit Thao MD as MD (Critical Care)  Rohit Thao MD as Assigned Pulmonology Provider  Keo Lee MD as MD (Dermatology)  Keo Lee MD as Assigned Surgical Provider  Judy Roberts PA-C as Physician Assistant        [FreeTextEntry1] : The underlying pathophysiology was reviewed with the patient. Treatment options were discussed including; observation, NSAIDS, and cast immobilization. \par \par A left short arm cast was applied. Cast care instructions were reviewed. \par NSAIDs as tolerated. \par Gentle ROM exercises in the cast were advised.\par Follow up in 4 weeks for cast removal and repeat x-rays.

## 2023-03-13 ENCOUNTER — APPOINTMENT (OUTPATIENT)
Dept: SURGERY | Facility: CLINIC | Age: 68
End: 2023-03-13
Payer: MEDICARE

## 2023-03-13 PROCEDURE — 99213K: CUSTOM

## 2023-05-24 ENCOUNTER — OUTPATIENT (OUTPATIENT)
Dept: OUTPATIENT SERVICES | Facility: HOSPITAL | Age: 68
LOS: 1 days | Discharge: HOME | End: 2023-05-24

## 2023-05-24 DIAGNOSIS — C50.919 MALIGNANT NEOPLASM OF UNSPECIFIED SITE OF UNSPECIFIED FEMALE BREAST: ICD-10-CM

## 2023-05-24 DIAGNOSIS — Z90.13 ACQUIRED ABSENCE OF BILATERAL BREASTS AND NIPPLES: Chronic | ICD-10-CM

## 2023-05-24 DIAGNOSIS — Z98.89 OTHER SPECIFIED POSTPROCEDURAL STATES: Chronic | ICD-10-CM

## 2023-06-07 ENCOUNTER — APPOINTMENT (OUTPATIENT)
Dept: HEMATOLOGY ONCOLOGY | Facility: CLINIC | Age: 68
End: 2023-06-07
Payer: MEDICARE

## 2023-06-07 ENCOUNTER — APPOINTMENT (OUTPATIENT)
Dept: HEMATOLOGY ONCOLOGY | Facility: CLINIC | Age: 68
End: 2023-06-07

## 2023-06-07 VITALS
OXYGEN SATURATION: 97 % | DIASTOLIC BLOOD PRESSURE: 77 MMHG | TEMPERATURE: 97 F | SYSTOLIC BLOOD PRESSURE: 125 MMHG | BODY MASS INDEX: 27.21 KG/M2 | RESPIRATION RATE: 16 BRPM | HEART RATE: 67 BPM | WEIGHT: 158.51 LBS

## 2023-06-07 DIAGNOSIS — M85.80 OTHER SPECIFIED DISORDERS OF BONE DENSITY AND STRUCTURE, UNSPECIFIED SITE: ICD-10-CM

## 2023-06-07 PROCEDURE — 99214 OFFICE O/P EST MOD 30 MIN: CPT

## 2023-06-07 NOTE — HISTORY OF PRESENT ILLNESS
[Disease: _____________________] : Disease: [unfilled] [T: ___] : T[unfilled] [N: ___] : N[unfilled] [M: ___] : M[unfilled] [Research Protocol] : Research Protocol  [de-identified] : Patient was seen for an initial visit with me on 10/30/19 and in order to transfer her care to me from that of Dr. Hoang. the history below is as per the patient and  the AEHR notes of Dr. Hoang who left the [facility].\par \par At 61 year old female had negative mammogram (5/12/2016) except for dense breasts, ultrasound (6/22/2016) right 1 cm mass and 0.5 cm mass; left 1 cm mass. Ultrasound biopsy right (6/28/2016): moderately differentiated duct cell carcinoma with micropapillary features; left benign. Breast MRI (7/14/2016): two masses; left negative. Right axillary ultrasound biopsy (8/01/2016): positive for carcinoma in node. Staging PET/CAT (8/19/2016): post surgical changes on the right chest, left renal cyst, no metastatic disease. EKG unremarkable. Had bilateral mastectomies 8/09/2016 with right: extensive invasive duct cell carcinoma with micropapillary features, lymphatic invasion, two distinct masses -- 5.5 cm and 1.0 cm with additional foci of tumor, DCIS, 11+ nodes, ER+ NE+ Her2/avni negative. Had immediate reconstruction. Noted to be anemic. Consistent with post operative. 8/2.57/2016 hepatitis screen negative. 9/01/2016 MUGA 61%, 10/16/2016 Finished cycle #3 dose dense AC. 11/03/2016 finished paclitaxel cycle 1/4. Tolerated drug well. has finished 4 cycles of paclitaxel. Received radiation to the chest wall and signed consent for the PALLAS trial. PET/CT 3/24/2017: no metastatic disease. Bone density  3/24/2017: spine T = -2.0. Had squamous cell skin cancer resected from right clavicle 3/10/2017. Started on anastrozole, vitamin D and calcium 3/27/2017. 4/17/2017 started on PALLAS study Arm A. Has persistently elevated CEA with no obvious source. CAT scan staging 7/15/2017 ? radiation changes in lung, 3 month follow up suggested. 0/05/2017 CEA 4.4. 10/21/2017: CAT scan staging: benign. 2/20/2018 CEA remains 4.0.8/15/2018 CEA 4. 4/10/2019 CEA 3.8. CA 27-29 13.  [de-identified] : infiltrating poorly differentiated micropapillary carcinoma, multifocal [de-identified] : ER+ MS+ Her2/avni negative by [facility] [de-identified] : 7/11/2016 [facility] Cancer Next panel 32 genes negative. [FreeTextEntry1] : dose dense AC-T start 9/09/2016 -- finished. Started anastrozole 3/27/2017; on PALLAS ARM A (palbociclib) start 4/17/2017. [de-identified] : The pt was seen for a routine f/u.\par \par She notes a good appetite, stable weight and excellent performance status. \par \par She denies any anastrozole treatment related side effects.\par   \par She has persistent mild CIPN toes since chemo with some further improvement R>L. (grade 1)\par \par She has baseline mild arthralgias w/o change from pre AI baseline- predom early AM awakening stiffness that resolves after moving . \par \par She denies all other complaints. \par \par In the interim: fractured R shoulder playing pickle ball. Doing well now. Goes to the gym daily. \par \par Notes a good appetite stable weight and excellent performance status.  \par  \par DEXA 8/21: osteopenia

## 2023-06-07 NOTE — PHYSICAL EXAM
[Fully active, able to carry on all pre-disease performance without restriction] : Status 0 - Fully active, able to carry on all pre-disease performance without restriction [Normal] : affect appropriate [de-identified] : s/p B/L MRM with EMILIA flap reconstruction. No palp masses. B/L ax neg.

## 2023-06-13 ENCOUNTER — RX RENEWAL (OUTPATIENT)
Age: 68
End: 2023-06-13

## 2023-08-28 ENCOUNTER — OUTPATIENT (OUTPATIENT)
Dept: OUTPATIENT SERVICES | Facility: HOSPITAL | Age: 68
LOS: 1 days | End: 2023-08-28
Payer: MEDICARE

## 2023-08-28 ENCOUNTER — APPOINTMENT (OUTPATIENT)
Dept: RADIOLOGY | Facility: IMAGING CENTER | Age: 68
End: 2023-08-28
Payer: MEDICARE

## 2023-08-28 DIAGNOSIS — Z90.13 ACQUIRED ABSENCE OF BILATERAL BREASTS AND NIPPLES: Chronic | ICD-10-CM

## 2023-08-28 DIAGNOSIS — Z98.89 OTHER SPECIFIED POSTPROCEDURAL STATES: Chronic | ICD-10-CM

## 2023-08-28 DIAGNOSIS — Z00.8 ENCOUNTER FOR OTHER GENERAL EXAMINATION: ICD-10-CM

## 2023-08-28 PROCEDURE — 77080 DXA BONE DENSITY AXIAL: CPT

## 2023-08-28 PROCEDURE — 77080 DXA BONE DENSITY AXIAL: CPT | Mod: 26

## 2023-08-31 ENCOUNTER — RX RENEWAL (OUTPATIENT)
Age: 68
End: 2023-08-31

## 2023-09-01 ENCOUNTER — NON-APPOINTMENT (OUTPATIENT)
Age: 68
End: 2023-09-01

## 2023-09-18 ENCOUNTER — NON-APPOINTMENT (OUTPATIENT)
Age: 68
End: 2023-09-18

## 2023-09-18 ENCOUNTER — APPOINTMENT (OUTPATIENT)
Dept: INTERNAL MEDICINE | Facility: CLINIC | Age: 68
End: 2023-09-18
Payer: MEDICARE

## 2023-09-18 VITALS
DIASTOLIC BLOOD PRESSURE: 70 MMHG | OXYGEN SATURATION: 98 % | HEIGHT: 64 IN | HEART RATE: 64 BPM | WEIGHT: 153 LBS | SYSTOLIC BLOOD PRESSURE: 114 MMHG | RESPIRATION RATE: 14 BRPM | BODY MASS INDEX: 26.12 KG/M2

## 2023-09-18 DIAGNOSIS — C50.919 MALIGNANT NEOPLASM OF UNSPECIFIED SITE OF UNSPECIFIED FEMALE BREAST: ICD-10-CM

## 2023-09-18 DIAGNOSIS — Z00.00 ENCOUNTER FOR GENERAL ADULT MEDICAL EXAMINATION W/OUT ABNORMAL FINDINGS: ICD-10-CM

## 2023-09-18 PROCEDURE — 93000 ELECTROCARDIOGRAM COMPLETE: CPT | Mod: 59

## 2023-09-18 PROCEDURE — G0439: CPT

## 2023-09-19 LAB
25(OH)D3 SERPL-MCNC: 42.2 NG/ML
ALBUMIN SERPL ELPH-MCNC: 4.3 G/DL
ALP BLD-CCNC: 87 U/L
ALT SERPL-CCNC: 18 U/L
ANION GAP SERPL CALC-SCNC: 13 MMOL/L
APPEARANCE: CLEAR
AST SERPL-CCNC: 21 U/L
BACTERIA: NEGATIVE /HPF
BASOPHILS # BLD AUTO: 0.06 K/UL
BASOPHILS NFR BLD AUTO: 0.9 %
BILIRUB SERPL-MCNC: 0.8 MG/DL
BILIRUBIN URINE: NEGATIVE
BLOOD URINE: NEGATIVE
BUN SERPL-MCNC: 18 MG/DL
CALCIUM SERPL-MCNC: 9.1 MG/DL
CAST: 0 /LPF
CHLORIDE SERPL-SCNC: 104 MMOL/L
CHOLEST SERPL-MCNC: 193 MG/DL
CO2 SERPL-SCNC: 23 MMOL/L
COLOR: YELLOW
CREAT SERPL-MCNC: 0.74 MG/DL
EGFR: 88 ML/MIN/1.73M2
EOSINOPHIL # BLD AUTO: 0.26 K/UL
EOSINOPHIL NFR BLD AUTO: 3.8 %
EPITHELIAL CELLS: 0 /HPF
ESTIMATED AVERAGE GLUCOSE: 123 MG/DL
FOLATE SERPL-MCNC: >20 NG/ML
GLUCOSE QUALITATIVE U: NEGATIVE MG/DL
GLUCOSE SERPL-MCNC: 86 MG/DL
HBA1C MFR BLD HPLC: 5.9 %
HCT VFR BLD CALC: 44.2 %
HDLC SERPL-MCNC: 67 MG/DL
HGB BLD-MCNC: 14 G/DL
IMM GRANULOCYTES NFR BLD AUTO: 0.1 %
KETONES URINE: NEGATIVE MG/DL
LDLC SERPL CALC-MCNC: 106 MG/DL
LEUKOCYTE ESTERASE URINE: NEGATIVE
LYMPHOCYTES # BLD AUTO: 1.59 K/UL
LYMPHOCYTES NFR BLD AUTO: 23.5 %
MAN DIFF?: NORMAL
MCHC RBC-ENTMCNC: 31 PG
MCHC RBC-ENTMCNC: 31.7 GM/DL
MCV RBC AUTO: 97.8 FL
MICROSCOPIC-UA: NORMAL
MONOCYTES # BLD AUTO: 0.48 K/UL
MONOCYTES NFR BLD AUTO: 7.1 %
NEUTROPHILS # BLD AUTO: 4.38 K/UL
NEUTROPHILS NFR BLD AUTO: 64.6 %
NITRITE URINE: NEGATIVE
NONHDLC SERPL-MCNC: 126 MG/DL
PH URINE: 6
PLATELET # BLD AUTO: 177 K/UL
POTASSIUM SERPL-SCNC: 4.4 MMOL/L
PROT SERPL-MCNC: 6.3 G/DL
PROTEIN URINE: NEGATIVE MG/DL
RBC # BLD: 4.52 M/UL
RBC # FLD: 13.2 %
RED BLOOD CELLS URINE: 0 /HPF
SODIUM SERPL-SCNC: 140 MMOL/L
SPECIFIC GRAVITY URINE: 1.02
TRIGL SERPL-MCNC: 114 MG/DL
TSH SERPL-ACNC: 3.31 UIU/ML
URATE SERPL-MCNC: 3.2 MG/DL
UROBILINOGEN URINE: 0.2 MG/DL
VIT B12 SERPL-MCNC: 721 PG/ML
WBC # FLD AUTO: 6.78 K/UL
WHITE BLOOD CELLS URINE: 0 /HPF

## 2023-10-29 ENCOUNTER — NON-APPOINTMENT (OUTPATIENT)
Age: 68
End: 2023-10-29

## 2023-11-09 ENCOUNTER — NON-APPOINTMENT (OUTPATIENT)
Age: 68
End: 2023-11-09

## 2023-11-27 NOTE — HISTORY OF PRESENT ILLNESS
[de-identified] : cpe\par \par Had bilateral mastectomies 8/16. Had stage 3 and chemo and R.T. Right breast ca. \par \par Had Pfizer x 3. Had flu vaccine. Up to date on Tdap. \par \par Walks everyday.  yes

## 2023-12-05 ENCOUNTER — OUTPATIENT (OUTPATIENT)
Dept: OUTPATIENT SERVICES | Facility: HOSPITAL | Age: 68
LOS: 1 days | Discharge: HOME | End: 2023-12-05

## 2023-12-05 DIAGNOSIS — Z90.13 ACQUIRED ABSENCE OF BILATERAL BREASTS AND NIPPLES: Chronic | ICD-10-CM

## 2023-12-05 DIAGNOSIS — C50.919 MALIGNANT NEOPLASM OF UNSPECIFIED SITE OF UNSPECIFIED FEMALE BREAST: ICD-10-CM

## 2023-12-05 DIAGNOSIS — Z98.89 OTHER SPECIFIED POSTPROCEDURAL STATES: Chronic | ICD-10-CM

## 2023-12-06 ENCOUNTER — NON-APPOINTMENT (OUTPATIENT)
Age: 68
End: 2023-12-06

## 2023-12-06 ENCOUNTER — APPOINTMENT (OUTPATIENT)
Dept: HEMATOLOGY ONCOLOGY | Facility: CLINIC | Age: 68
End: 2023-12-06
Payer: MEDICARE

## 2023-12-06 VITALS
SYSTOLIC BLOOD PRESSURE: 131 MMHG | DIASTOLIC BLOOD PRESSURE: 72 MMHG | HEART RATE: 69 BPM | TEMPERATURE: 98.3 F | RESPIRATION RATE: 16 BRPM | WEIGHT: 157.63 LBS | OXYGEN SATURATION: 97 % | BODY MASS INDEX: 27.06 KG/M2

## 2023-12-06 DIAGNOSIS — Z79.811 LONG TERM (CURRENT) USE OF AROMATASE INHIBITORS: ICD-10-CM

## 2023-12-06 LAB — CEA SERPL-MCNC: 2.3 NG/ML

## 2023-12-06 PROCEDURE — 99214 OFFICE O/P EST MOD 30 MIN: CPT

## 2023-12-06 RX ORDER — TRAMADOL HYDROCHLORIDE AND ACETAMINOPHEN 37.5; 325 MG/1; MG/1
37.5-325 TABLET, FILM COATED ORAL
Qty: 60 | Refills: 0 | Status: DISCONTINUED | COMMUNITY
Start: 2022-11-03 | End: 2023-12-06

## 2023-12-06 RX ORDER — MULTIVITAMIN
TABLET ORAL
Refills: 0 | Status: ACTIVE | COMMUNITY

## 2023-12-07 LAB — CANCER AG27-29 SERPL-ACNC: 9.8 U/ML

## 2024-01-23 ENCOUNTER — OUTPATIENT (OUTPATIENT)
Dept: OUTPATIENT SERVICES | Facility: HOSPITAL | Age: 69
LOS: 1 days | Discharge: HOME | End: 2024-01-23

## 2024-01-23 DIAGNOSIS — Z98.89 OTHER SPECIFIED POSTPROCEDURAL STATES: Chronic | ICD-10-CM

## 2024-01-23 DIAGNOSIS — C50.919 MALIGNANT NEOPLASM OF UNSPECIFIED SITE OF UNSPECIFIED FEMALE BREAST: ICD-10-CM

## 2024-01-23 DIAGNOSIS — Z90.13 ACQUIRED ABSENCE OF BILATERAL BREASTS AND NIPPLES: Chronic | ICD-10-CM

## 2024-03-13 ENCOUNTER — APPOINTMENT (OUTPATIENT)
Dept: SURGERY | Facility: CLINIC | Age: 69
End: 2024-03-13
Payer: MEDICARE

## 2024-03-13 PROCEDURE — 99213K: CUSTOM

## 2024-04-26 ENCOUNTER — RX RENEWAL (OUTPATIENT)
Age: 69
End: 2024-04-26

## 2024-05-28 ENCOUNTER — NON-APPOINTMENT (OUTPATIENT)
Age: 69
End: 2024-05-28

## 2024-06-05 ENCOUNTER — APPOINTMENT (OUTPATIENT)
Dept: HEMATOLOGY ONCOLOGY | Facility: CLINIC | Age: 69
End: 2024-06-05

## 2024-06-12 ENCOUNTER — NON-APPOINTMENT (OUTPATIENT)
Age: 69
End: 2024-06-12

## 2024-06-24 ENCOUNTER — RX RENEWAL (OUTPATIENT)
Age: 69
End: 2024-06-24

## 2024-06-24 RX ORDER — ANASTROZOLE TABLETS 1 MG/1
1 TABLET ORAL
Qty: 90 | Refills: 0 | Status: ACTIVE | COMMUNITY
Start: 2023-06-13 | End: 1900-01-01

## 2024-06-28 ENCOUNTER — RESULT REVIEW (OUTPATIENT)
Age: 69
End: 2024-06-28

## 2024-06-28 ENCOUNTER — APPOINTMENT (OUTPATIENT)
Dept: HEMATOLOGY ONCOLOGY | Facility: CLINIC | Age: 69
End: 2024-06-28
Payer: MEDICARE

## 2024-06-28 DIAGNOSIS — C50.919 MALIGNANT NEOPLASM OF UNSPECIFIED SITE OF UNSPECIFIED FEMALE BREAST: ICD-10-CM

## 2024-06-28 PROCEDURE — 99205 OFFICE O/P NEW HI 60 MIN: CPT

## 2024-06-29 LAB
25(OH)D3 SERPL-MCNC: 42.2 NG/ML
ALBUMIN SERPL ELPH-MCNC: 4.3 G/DL
ALP BLD-CCNC: 85 U/L
ALT SERPL-CCNC: 21 U/L
ANION GAP SERPL CALC-SCNC: 9 MMOL/L
AST SERPL-CCNC: 24 U/L
BILIRUB SERPL-MCNC: 0.7 MG/DL
BUN SERPL-MCNC: 23 MG/DL
CALCIUM SERPL-MCNC: 9.4 MG/DL
CHLORIDE SERPL-SCNC: 105 MMOL/L
CO2 SERPL-SCNC: 26 MMOL/L
CREAT SERPL-MCNC: 0.77 MG/DL
EGFR: 83 ML/MIN/1.73M2
GLUCOSE SERPL-MCNC: 101 MG/DL
POTASSIUM SERPL-SCNC: 4.7 MMOL/L
PROT SERPL-MCNC: 6.6 G/DL
SODIUM SERPL-SCNC: 139 MMOL/L

## 2024-07-01 LAB — CANCER AG27-29 SERPL-ACNC: 13.6 U/ML

## 2024-09-07 ENCOUNTER — RX RENEWAL (OUTPATIENT)
Age: 69
End: 2024-09-07

## 2024-11-13 ENCOUNTER — NON-APPOINTMENT (OUTPATIENT)
Age: 69
End: 2024-11-13

## 2024-11-13 ENCOUNTER — APPOINTMENT (OUTPATIENT)
Dept: INTERNAL MEDICINE | Facility: CLINIC | Age: 69
End: 2024-11-13
Payer: MEDICARE

## 2024-11-13 VITALS
HEART RATE: 76 BPM | DIASTOLIC BLOOD PRESSURE: 80 MMHG | BODY MASS INDEX: 25.44 KG/M2 | TEMPERATURE: 98.4 F | RESPIRATION RATE: 16 BRPM | OXYGEN SATURATION: 98 % | HEIGHT: 64 IN | SYSTOLIC BLOOD PRESSURE: 122 MMHG | WEIGHT: 149 LBS

## 2024-11-13 DIAGNOSIS — Z00.00 ENCOUNTER FOR GENERAL ADULT MEDICAL EXAMINATION W/OUT ABNORMAL FINDINGS: ICD-10-CM

## 2024-11-13 PROCEDURE — G0439: CPT

## 2024-11-13 PROCEDURE — 93000 ELECTROCARDIOGRAM COMPLETE: CPT

## 2024-11-13 PROCEDURE — G0009: CPT

## 2024-11-13 PROCEDURE — 90677 PCV20 VACCINE IM: CPT

## 2024-11-14 LAB
25(OH)D3 SERPL-MCNC: 40 NG/ML
ALBUMIN SERPL ELPH-MCNC: 4.3 G/DL
ALP BLD-CCNC: 93 U/L
ALT SERPL-CCNC: 24 U/L
ANION GAP SERPL CALC-SCNC: 12 MMOL/L
AST SERPL-CCNC: 21 U/L
BASOPHILS # BLD AUTO: 0.07 K/UL
BASOPHILS NFR BLD AUTO: 1 %
BILIRUB SERPL-MCNC: 0.9 MG/DL
BUN SERPL-MCNC: 23 MG/DL
CALCIUM SERPL-MCNC: 9.6 MG/DL
CHLORIDE SERPL-SCNC: 102 MMOL/L
CHOLEST SERPL-MCNC: 217 MG/DL
CO2 SERPL-SCNC: 25 MMOL/L
CREAT SERPL-MCNC: 0.73 MG/DL
EGFR: 89 ML/MIN/1.73M2
EOSINOPHIL # BLD AUTO: 0.14 K/UL
EOSINOPHIL NFR BLD AUTO: 2.1 %
ESTIMATED AVERAGE GLUCOSE: 114 MG/DL
FOLATE SERPL-MCNC: >20 NG/ML
GLUCOSE SERPL-MCNC: 98 MG/DL
HBA1C MFR BLD HPLC: 5.6 %
HCT VFR BLD CALC: 42.6 %
HCV AB SER QL: NONREACTIVE
HCV S/CO RATIO: 0.08 S/CO
HDLC SERPL-MCNC: 75 MG/DL
HGB BLD-MCNC: 14.3 G/DL
IMM GRANULOCYTES NFR BLD AUTO: 0.1 %
LDLC SERPL CALC-MCNC: 118 MG/DL
LYMPHOCYTES # BLD AUTO: 1.65 K/UL
LYMPHOCYTES NFR BLD AUTO: 24.7 %
MAGNESIUM SERPL-MCNC: 2.1 MG/DL
MAN DIFF?: NORMAL
MCHC RBC-ENTMCNC: 31.2 PG
MCHC RBC-ENTMCNC: 33.6 G/DL
MCV RBC AUTO: 93 FL
MONOCYTES # BLD AUTO: 0.54 K/UL
MONOCYTES NFR BLD AUTO: 8.1 %
NEUTROPHILS # BLD AUTO: 4.27 K/UL
NEUTROPHILS NFR BLD AUTO: 64 %
NONHDLC SERPL-MCNC: 142 MG/DL
PLATELET # BLD AUTO: 195 K/UL
POTASSIUM SERPL-SCNC: 4.6 MMOL/L
PROT SERPL-MCNC: 6.8 G/DL
RBC # BLD: 4.58 M/UL
RBC # FLD: 12.9 %
SODIUM SERPL-SCNC: 138 MMOL/L
TRIGL SERPL-MCNC: 141 MG/DL
TSH SERPL-ACNC: 3.67 UIU/ML
VIT B12 SERPL-MCNC: 782 PG/ML
WBC # FLD AUTO: 6.68 K/UL

## 2024-11-29 ENCOUNTER — RX RENEWAL (OUTPATIENT)
Age: 69
End: 2024-11-29

## 2024-12-19 ENCOUNTER — OUTPATIENT (OUTPATIENT)
Dept: OUTPATIENT SERVICES | Facility: HOSPITAL | Age: 69
LOS: 1 days | Discharge: HOME | End: 2024-12-19

## 2024-12-19 DIAGNOSIS — Z98.89 OTHER SPECIFIED POSTPROCEDURAL STATES: Chronic | ICD-10-CM

## 2024-12-19 DIAGNOSIS — C50.919 MALIGNANT NEOPLASM OF UNSPECIFIED SITE OF UNSPECIFIED FEMALE BREAST: ICD-10-CM

## 2024-12-19 DIAGNOSIS — Z90.13 ACQUIRED ABSENCE OF BILATERAL BREASTS AND NIPPLES: Chronic | ICD-10-CM

## 2024-12-23 ENCOUNTER — APPOINTMENT (OUTPATIENT)
Dept: HEMATOLOGY ONCOLOGY | Facility: CLINIC | Age: 69
End: 2024-12-23
Payer: MEDICARE

## 2024-12-23 VITALS
RESPIRATION RATE: 17 BRPM | SYSTOLIC BLOOD PRESSURE: 118 MMHG | WEIGHT: 150 LBS | HEIGHT: 64 IN | BODY MASS INDEX: 25.61 KG/M2 | HEART RATE: 76 BPM | DIASTOLIC BLOOD PRESSURE: 73 MMHG | TEMPERATURE: 97 F

## 2024-12-23 DIAGNOSIS — C50.919 MALIGNANT NEOPLASM OF UNSPECIFIED SITE OF UNSPECIFIED FEMALE BREAST: ICD-10-CM

## 2024-12-23 PROCEDURE — 99214 OFFICE O/P EST MOD 30 MIN: CPT

## 2025-02-20 ENCOUNTER — NON-APPOINTMENT (OUTPATIENT)
Age: 70
End: 2025-02-20

## 2025-02-28 ENCOUNTER — NON-APPOINTMENT (OUTPATIENT)
Age: 70
End: 2025-02-28

## 2025-02-28 ENCOUNTER — APPOINTMENT (OUTPATIENT)
Dept: OTOLARYNGOLOGY | Facility: CLINIC | Age: 70
End: 2025-02-28
Payer: MEDICARE

## 2025-02-28 VITALS
HEIGHT: 64 IN | WEIGHT: 155 LBS | HEART RATE: 64 BPM | BODY MASS INDEX: 26.46 KG/M2 | SYSTOLIC BLOOD PRESSURE: 117 MMHG | DIASTOLIC BLOOD PRESSURE: 70 MMHG

## 2025-02-28 DIAGNOSIS — H66.90 OTITIS MEDIA, UNSPECIFIED, UNSPECIFIED EAR: ICD-10-CM

## 2025-02-28 PROCEDURE — 99203 OFFICE O/P NEW LOW 30 MIN: CPT

## 2025-02-28 RX ORDER — NEOMYCIN AND POLYMYXIN B SULFATES AND HYDROCORTISONE OTIC 10; 3.5; 1 MG/ML; MG/ML; [USP'U]/ML
3.5-10000-1 SUSPENSION AURICULAR (OTIC) 4 TIMES DAILY
Qty: 1 | Refills: 2 | Status: ACTIVE | COMMUNITY
Start: 2025-02-28 | End: 1900-01-01

## 2025-02-28 RX ORDER — AMOXICILLIN AND CLAVULANATE POTASSIUM 875; 125 MG/1; MG/1
875-125 TABLET, COATED ORAL
Qty: 20 | Refills: 1 | Status: ACTIVE | COMMUNITY
Start: 2025-02-28 | End: 1900-01-01

## 2025-02-28 RX ORDER — ELECTROLYTES/DEXTROSE
SOLUTION, ORAL ORAL
Refills: 0 | Status: ACTIVE | COMMUNITY

## 2025-03-05 ENCOUNTER — APPOINTMENT (OUTPATIENT)
Dept: SURGERY | Facility: CLINIC | Age: 70
End: 2025-03-05
Payer: MEDICARE

## 2025-03-05 PROCEDURE — 99213K: CUSTOM

## 2025-03-12 ENCOUNTER — APPOINTMENT (OUTPATIENT)
Dept: OTOLARYNGOLOGY | Facility: CLINIC | Age: 70
End: 2025-03-12
Payer: MEDICARE

## 2025-03-12 VITALS
BODY MASS INDEX: 26.46 KG/M2 | DIASTOLIC BLOOD PRESSURE: 81 MMHG | HEART RATE: 69 BPM | SYSTOLIC BLOOD PRESSURE: 124 MMHG | WEIGHT: 155 LBS | HEIGHT: 64 IN

## 2025-03-12 DIAGNOSIS — H66.90 OTITIS MEDIA, UNSPECIFIED, UNSPECIFIED EAR: ICD-10-CM

## 2025-03-12 DIAGNOSIS — H91.90 UNSPECIFIED HEARING LOSS, UNSPECIFIED EAR: ICD-10-CM

## 2025-03-12 DIAGNOSIS — H69.90 UNSPECIFIED EUSTACHIAN TUBE DISORDER, UNSPECIFIED EAR: ICD-10-CM

## 2025-03-12 PROCEDURE — 92557 COMPREHENSIVE HEARING TEST: CPT

## 2025-03-12 PROCEDURE — 92567 TYMPANOMETRY: CPT

## 2025-03-12 PROCEDURE — 99213 OFFICE O/P EST LOW 20 MIN: CPT | Mod: 25

## 2025-03-24 ENCOUNTER — RX RENEWAL (OUTPATIENT)
Age: 70
End: 2025-03-24

## 2025-04-23 ENCOUNTER — APPOINTMENT (OUTPATIENT)
Dept: OTOLARYNGOLOGY | Facility: CLINIC | Age: 70
End: 2025-04-23
Payer: MEDICARE

## 2025-04-23 VITALS
BODY MASS INDEX: 26.46 KG/M2 | DIASTOLIC BLOOD PRESSURE: 68 MMHG | HEIGHT: 64 IN | HEART RATE: 69 BPM | SYSTOLIC BLOOD PRESSURE: 110 MMHG | WEIGHT: 155 LBS

## 2025-04-23 DIAGNOSIS — H60.90 UNSPECIFIED OTITIS EXTERNA, UNSPECIFIED EAR: ICD-10-CM

## 2025-04-23 DIAGNOSIS — H69.90 UNSPECIFIED EUSTACHIAN TUBE DISORDER, UNSPECIFIED EAR: ICD-10-CM

## 2025-04-23 DIAGNOSIS — H61.20 IMPACTED CERUMEN, UNSPECIFIED EAR: ICD-10-CM

## 2025-04-23 PROCEDURE — 92557 COMPREHENSIVE HEARING TEST: CPT

## 2025-04-23 PROCEDURE — 92567 TYMPANOMETRY: CPT | Mod: LT

## 2025-04-23 PROCEDURE — G0268 REMOVAL OF IMPACTED WAX MD: CPT

## 2025-04-23 PROCEDURE — 99213 OFFICE O/P EST LOW 20 MIN: CPT | Mod: 25

## 2025-04-23 RX ORDER — NEOMYCIN AND POLYMYXIN B SULFATES AND HYDROCORTISONE OTIC 10; 3.5; 1 MG/ML; MG/ML; [USP'U]/ML
3.5-10000-1 SUSPENSION AURICULAR (OTIC) 4 TIMES DAILY
Qty: 1 | Refills: 2 | Status: ACTIVE | COMMUNITY
Start: 2025-04-23 | End: 1900-01-01

## 2025-04-23 RX ORDER — UBIDECARENONE 100 MG
100 CAPSULE ORAL
Refills: 0 | Status: ACTIVE | COMMUNITY

## 2025-05-28 ENCOUNTER — APPOINTMENT (OUTPATIENT)
Dept: INTERNAL MEDICINE | Facility: CLINIC | Age: 70
End: 2025-05-28
Payer: MEDICARE

## 2025-05-28 VITALS
SYSTOLIC BLOOD PRESSURE: 122 MMHG | HEIGHT: 64 IN | WEIGHT: 156 LBS | OXYGEN SATURATION: 96 % | RESPIRATION RATE: 15 BRPM | HEART RATE: 73 BPM | BODY MASS INDEX: 26.63 KG/M2 | TEMPERATURE: 98.3 F | DIASTOLIC BLOOD PRESSURE: 78 MMHG

## 2025-05-28 DIAGNOSIS — R07.81 PLEURODYNIA: ICD-10-CM

## 2025-05-28 DIAGNOSIS — M85.80 OTHER SPECIFIED DISORDERS OF BONE DENSITY AND STRUCTURE, UNSPECIFIED SITE: ICD-10-CM

## 2025-05-28 DIAGNOSIS — C50.919 MALIGNANT NEOPLASM OF UNSPECIFIED SITE OF UNSPECIFIED FEMALE BREAST: ICD-10-CM

## 2025-05-28 PROCEDURE — 99214 OFFICE O/P EST MOD 30 MIN: CPT

## 2025-05-28 PROCEDURE — G2211 COMPLEX E/M VISIT ADD ON: CPT

## 2025-05-29 ENCOUNTER — OUTPATIENT (OUTPATIENT)
Dept: OUTPATIENT SERVICES | Facility: HOSPITAL | Age: 70
LOS: 1 days | End: 2025-05-29
Payer: MEDICARE

## 2025-05-29 ENCOUNTER — APPOINTMENT (OUTPATIENT)
Dept: RADIOLOGY | Facility: CLINIC | Age: 70
End: 2025-05-29
Payer: MEDICARE

## 2025-05-29 ENCOUNTER — NON-APPOINTMENT (OUTPATIENT)
Age: 70
End: 2025-05-29

## 2025-05-29 DIAGNOSIS — Z90.13 ACQUIRED ABSENCE OF BILATERAL BREASTS AND NIPPLES: Chronic | ICD-10-CM

## 2025-05-29 DIAGNOSIS — Z98.89 OTHER SPECIFIED POSTPROCEDURAL STATES: Chronic | ICD-10-CM

## 2025-05-29 DIAGNOSIS — R07.81 PLEURODYNIA: ICD-10-CM

## 2025-05-29 PROCEDURE — 71100 X-RAY EXAM RIBS UNI 2 VIEWS: CPT

## 2025-05-29 PROCEDURE — 71100 X-RAY EXAM RIBS UNI 2 VIEWS: CPT | Mod: 26,RT

## 2025-06-01 ENCOUNTER — NON-APPOINTMENT (OUTPATIENT)
Age: 70
End: 2025-06-01

## 2025-07-14 ENCOUNTER — RX RENEWAL (OUTPATIENT)
Age: 70
End: 2025-07-14

## 2025-07-18 ENCOUNTER — NON-APPOINTMENT (OUTPATIENT)
Age: 70
End: 2025-07-18

## 2025-08-29 ENCOUNTER — APPOINTMENT (OUTPATIENT)
Dept: RADIOLOGY | Facility: CLINIC | Age: 70
End: 2025-08-29
Payer: MEDICARE

## 2025-08-29 ENCOUNTER — OUTPATIENT (OUTPATIENT)
Dept: OUTPATIENT SERVICES | Facility: HOSPITAL | Age: 70
LOS: 1 days | End: 2025-08-29
Payer: MEDICARE

## 2025-08-29 DIAGNOSIS — Z90.13 ACQUIRED ABSENCE OF BILATERAL BREASTS AND NIPPLES: Chronic | ICD-10-CM

## 2025-08-29 DIAGNOSIS — Z98.89 OTHER SPECIFIED POSTPROCEDURAL STATES: Chronic | ICD-10-CM

## 2025-08-29 DIAGNOSIS — C50.919 MALIGNANT NEOPLASM OF UNSPECIFIED SITE OF UNSPECIFIED FEMALE BREAST: ICD-10-CM

## 2025-08-29 PROCEDURE — 77080 DXA BONE DENSITY AXIAL: CPT | Mod: 26

## 2025-08-29 PROCEDURE — 77080 DXA BONE DENSITY AXIAL: CPT

## 2025-09-18 ENCOUNTER — RESULT REVIEW (OUTPATIENT)
Age: 70
End: 2025-09-18

## 2025-09-18 ENCOUNTER — APPOINTMENT (OUTPATIENT)
Dept: HEMATOLOGY ONCOLOGY | Facility: CLINIC | Age: 70
End: 2025-09-18
Payer: MEDICARE

## 2025-09-18 VITALS
SYSTOLIC BLOOD PRESSURE: 123 MMHG | RESPIRATION RATE: 16 BRPM | BODY MASS INDEX: 26.25 KG/M2 | OXYGEN SATURATION: 96 % | HEART RATE: 63 BPM | DIASTOLIC BLOOD PRESSURE: 69 MMHG | TEMPERATURE: 97.4 F | HEIGHT: 63.78 IN | WEIGHT: 151.88 LBS

## 2025-09-18 DIAGNOSIS — C50.919 MALIGNANT NEOPLASM OF UNSPECIFIED SITE OF UNSPECIFIED FEMALE BREAST: ICD-10-CM

## 2025-09-18 LAB
25(OH)D3 SERPL-MCNC: 44.9 NG/ML
ALBUMIN SERPL ELPH-MCNC: 4.1 G/DL
ALP BLD-CCNC: 85 U/L
ALT SERPL-CCNC: 27 U/L
ANION GAP SERPL CALC-SCNC: 12 MMOL/L
AST SERPL-CCNC: 31 U/L
BILIRUB SERPL-MCNC: 0.7 MG/DL
BUN SERPL-MCNC: 19 MG/DL
CALCIUM SERPL-MCNC: 9.3 MG/DL
CHLORIDE SERPL-SCNC: 104 MMOL/L
CO2 SERPL-SCNC: 25 MMOL/L
CREAT SERPL-MCNC: 0.76 MG/DL
EGFRCR SERPLBLD CKD-EPI 2021: 84 ML/MIN/1.73M2
GLUCOSE SERPL-MCNC: 105 MG/DL
POTASSIUM SERPL-SCNC: 4.7 MMOL/L
PROT SERPL-MCNC: 6.7 G/DL
SODIUM SERPL-SCNC: 140 MMOL/L

## 2025-09-18 PROCEDURE — 99214 OFFICE O/P EST MOD 30 MIN: CPT
